# Patient Record
Sex: FEMALE | Race: WHITE | NOT HISPANIC OR LATINO | Employment: FULL TIME | URBAN - METROPOLITAN AREA
[De-identification: names, ages, dates, MRNs, and addresses within clinical notes are randomized per-mention and may not be internally consistent; named-entity substitution may affect disease eponyms.]

---

## 2023-04-19 PROBLEM — H69.91 DYSFUNCTION OF RIGHT EUSTACHIAN TUBE: Status: ACTIVE | Noted: 2023-04-19

## 2023-04-19 PROBLEM — M25.552 LEFT HIP PAIN: Status: ACTIVE | Noted: 2023-04-19

## 2023-04-19 PROBLEM — E66.3 OVERWEIGHT: Status: ACTIVE | Noted: 2023-04-19

## 2023-04-19 PROBLEM — E03.9 HYPOTHYROIDISM: Status: ACTIVE | Noted: 2023-04-19

## 2023-04-19 PROBLEM — Z97.3 WEARS GLASSES: Status: ACTIVE | Noted: 2023-04-19

## 2023-04-19 PROBLEM — M19.90 OSTEOARTHRITIS: Status: ACTIVE | Noted: 2023-04-19

## 2023-04-19 PROBLEM — I27.24 CTEPH (CHRONIC THROMBOEMBOLIC PULMONARY HYPERTENSION) (MULTI): Status: ACTIVE | Noted: 2023-04-19

## 2023-04-19 PROBLEM — M41.80 DEXTROSCOLIOSIS: Status: ACTIVE | Noted: 2023-04-19

## 2023-04-19 PROBLEM — M47.816 LUMBAR SPONDYLOSIS: Status: ACTIVE | Noted: 2023-04-19

## 2023-04-19 PROBLEM — J01.90 ACUTE SINUSITIS: Status: ACTIVE | Noted: 2023-04-19

## 2023-04-19 PROBLEM — R06.83 SNORING: Status: ACTIVE | Noted: 2023-04-19

## 2023-04-19 PROBLEM — M41.9 SCOLIOSIS: Status: ACTIVE | Noted: 2023-04-19

## 2023-04-19 PROBLEM — J82.83 EOSINOPHILIC ASTHMA (HHS-HCC): Status: ACTIVE | Noted: 2023-04-19

## 2023-04-19 PROBLEM — D89.89 ANTISYNTHETASE SYNDROME (MULTI): Status: ACTIVE | Noted: 2023-04-19

## 2023-04-19 PROBLEM — M06.9 RHEUMATOID ARTHRITIS (MULTI): Status: ACTIVE | Noted: 2023-04-19

## 2023-04-19 PROBLEM — J44.9 COPD (CHRONIC OBSTRUCTIVE PULMONARY DISEASE) (MULTI): Status: ACTIVE | Noted: 2023-04-19

## 2023-04-19 PROBLEM — E78.5 DYSLIPIDEMIA: Status: ACTIVE | Noted: 2023-04-19

## 2023-04-19 PROBLEM — J45.909 ASTHMATIC BRONCHITIS (HHS-HCC): Status: ACTIVE | Noted: 2023-04-19

## 2023-04-19 PROBLEM — L98.9 SKIN LESION: Status: ACTIVE | Noted: 2023-04-19

## 2023-04-19 PROBLEM — R06.2 WHEEZING ON AUSCULTATION: Status: ACTIVE | Noted: 2023-04-19

## 2023-04-19 PROBLEM — R60.9 EDEMA: Status: ACTIVE | Noted: 2023-04-19

## 2023-04-19 PROBLEM — G56.00 CARPAL TUNNEL SYNDROME: Status: ACTIVE | Noted: 2023-04-19

## 2023-04-19 PROBLEM — M85.80 OSTEOPENIA: Status: ACTIVE | Noted: 2023-04-19

## 2023-04-19 PROBLEM — K21.9 ACID REFLUX: Status: ACTIVE | Noted: 2023-04-19

## 2023-04-19 PROBLEM — L30.1 DYSHIDROTIC ECZEMA: Status: ACTIVE | Noted: 2023-04-19

## 2023-04-19 PROBLEM — M54.50 LOW BACK PAIN: Status: ACTIVE | Noted: 2023-04-19

## 2023-04-19 PROBLEM — E55.9 VITAMIN D DEFICIENCY: Status: ACTIVE | Noted: 2023-04-19

## 2023-04-19 RX ORDER — FLUTICASONE FUROATE, UMECLIDINIUM BROMIDE AND VILANTEROL TRIFENATATE 200; 62.5; 25 UG/1; UG/1; UG/1
POWDER RESPIRATORY (INHALATION)
COMMUNITY
Start: 2020-12-16

## 2023-04-19 RX ORDER — SULFASALAZINE 500 MG/1
TABLET, DELAYED RELEASE ORAL
COMMUNITY
Start: 2012-10-05 | End: 2023-08-03 | Stop reason: DRUGHIGH

## 2023-04-19 RX ORDER — CALCIUM CARBONATE/VITAMIN D3 600 MG-20
1 TABLET,CHEWABLE ORAL DAILY
COMMUNITY
Start: 2013-03-19

## 2023-04-19 RX ORDER — CHOLECALCIFEROL (VITAMIN D3) 125 MCG
CAPSULE ORAL
COMMUNITY
Start: 2021-10-13

## 2023-04-19 RX ORDER — PREDNISONE 5 MG/1
1 TABLET ORAL DAILY
COMMUNITY
Start: 2013-01-24 | End: 2023-08-03 | Stop reason: ALTCHOICE

## 2023-04-19 RX ORDER — IPRATROPIUM BROMIDE AND ALBUTEROL SULFATE 2.5; .5 MG/3ML; MG/3ML
SOLUTION RESPIRATORY (INHALATION) 4 TIMES DAILY
COMMUNITY
Start: 2017-03-08

## 2023-04-19 RX ORDER — LEVOTHYROXINE SODIUM 75 UG/1
TABLET ORAL
COMMUNITY
Start: 2016-03-10 | End: 2024-01-19 | Stop reason: SDUPTHER

## 2023-04-19 RX ORDER — ALENDRONATE SODIUM 70 MG/1
TABLET ORAL
COMMUNITY
Start: 2021-03-02 | End: 2023-08-03 | Stop reason: ALTCHOICE

## 2023-04-19 RX ORDER — ALBUTEROL SULFATE 90 UG/1
AEROSOL, METERED RESPIRATORY (INHALATION)
COMMUNITY
Start: 2013-03-19 | End: 2024-01-19 | Stop reason: SDUPTHER

## 2023-05-16 ENCOUNTER — PATIENT OUTREACH (OUTPATIENT)
Dept: CARE COORDINATION | Facility: CLINIC | Age: 58
End: 2023-05-16
Payer: COMMERCIAL

## 2023-05-16 RX ORDER — DOXYCYCLINE 100 MG/1
100 CAPSULE ORAL 2 TIMES DAILY
COMMUNITY
Start: 2023-05-14 | End: 2023-08-03 | Stop reason: ALTCHOICE

## 2023-05-16 RX ORDER — PREDNISONE 10 MG/1
TABLET ORAL
COMMUNITY
Start: 2023-05-14 | End: 2024-03-29 | Stop reason: ALTCHOICE

## 2023-05-16 RX ORDER — PANTOPRAZOLE SODIUM 40 MG/1
40 TABLET, DELAYED RELEASE ORAL DAILY
COMMUNITY
Start: 2023-05-14 | End: 2023-08-03 | Stop reason: WASHOUT

## 2023-05-16 NOTE — PROGRESS NOTES
Discharge Facility: [facility]  Discharge Diagnosis: COPD exacerbation  Admission Date: 5/11/23  Discharge Date: 5/14/23    PCP Appointment Date: TBD unable to reach patient  Specialist Appointment Date: TBD patient was told to fu with Dr Banegas  Hospital Encounter and Summary: Linked to encounter however more info available in [place] [facility]    Attempted to outreach patient twice for [facility] fu, left voicemail x2 with no return call as of this note.

## 2023-05-31 ENCOUNTER — DOCUMENTATION (OUTPATIENT)
Dept: PRIMARY CARE | Facility: CLINIC | Age: 58
End: 2023-05-31
Payer: COMMERCIAL

## 2023-06-01 ENCOUNTER — PATIENT OUTREACH (OUTPATIENT)
Dept: CARE COORDINATION | Facility: CLINIC | Age: 58
End: 2023-06-01
Payer: COMMERCIAL

## 2023-06-01 NOTE — PROGRESS NOTES
Discharge Facility: [facility]  Discharge Diagnosis: COPD exacerbation  Admission Date: 5/26/23  Discharge Date: 5/30/23    PCP Appointment Date: N/A unable to reach  Specialist Appointment Date: Dr Banegas unable to reach  Hospital Encounter and Summary: Available in [place] [facility]    Patient readmitted for COPD within 30 days, unable to reach at this time left VM x2. Has PCP appt in July. Following with pulmonology at [facility].

## 2023-06-15 ENCOUNTER — PATIENT OUTREACH (OUTPATIENT)
Dept: CARE COORDINATION | Facility: CLINIC | Age: 58
End: 2023-06-15
Payer: COMMERCIAL

## 2023-06-15 NOTE — PROGRESS NOTES
Unable to reach patient for call back after patient's hospitalization.  M with call back number for patient to call if needed   If no voicemail available call attempts x 2 were made to contact the patient to assist with any questions or concerns patient may have.

## 2023-08-03 ENCOUNTER — OFFICE VISIT (OUTPATIENT)
Dept: PRIMARY CARE | Facility: CLINIC | Age: 58
End: 2023-08-03
Payer: COMMERCIAL

## 2023-08-03 VITALS
SYSTOLIC BLOOD PRESSURE: 131 MMHG | TEMPERATURE: 98.7 F | DIASTOLIC BLOOD PRESSURE: 85 MMHG | OXYGEN SATURATION: 96 % | WEIGHT: 216 LBS | HEART RATE: 95 BPM | BODY MASS INDEX: 38.26 KG/M2 | RESPIRATION RATE: 16 BRPM

## 2023-08-03 DIAGNOSIS — Z12.31 ENCOUNTER FOR SCREENING MAMMOGRAM FOR BREAST CANCER: ICD-10-CM

## 2023-08-03 DIAGNOSIS — E03.9 ACQUIRED HYPOTHYROIDISM: Primary | ICD-10-CM

## 2023-08-03 DIAGNOSIS — J42 CHRONIC BRONCHITIS, UNSPECIFIED CHRONIC BRONCHITIS TYPE (MULTI): ICD-10-CM

## 2023-08-03 DIAGNOSIS — K21.00 GASTROESOPHAGEAL REFLUX DISEASE WITH ESOPHAGITIS WITHOUT HEMORRHAGE: ICD-10-CM

## 2023-08-03 DIAGNOSIS — M05.79 RHEUMATOID ARTHRITIS INVOLVING MULTIPLE SITES WITH POSITIVE RHEUMATOID FACTOR (MULTI): ICD-10-CM

## 2023-08-03 DIAGNOSIS — Z71.85 IMMUNIZATION COUNSELING: ICD-10-CM

## 2023-08-03 PROBLEM — F17.200 NICOTINE DEPENDENCE: Status: ACTIVE | Noted: 2023-08-03

## 2023-08-03 PROBLEM — J01.90 ACUTE SINUSITIS: Status: RESOLVED | Noted: 2023-04-19 | Resolved: 2023-08-03

## 2023-08-03 PROCEDURE — 90677 PCV20 VACCINE IM: CPT | Performed by: INTERNAL MEDICINE

## 2023-08-03 PROCEDURE — 90471 IMMUNIZATION ADMIN: CPT | Performed by: INTERNAL MEDICINE

## 2023-08-03 PROCEDURE — 99214 OFFICE O/P EST MOD 30 MIN: CPT | Performed by: INTERNAL MEDICINE

## 2023-08-03 RX ORDER — LEVOFLOXACIN 500 MG/1
TABLET, FILM COATED ORAL
COMMUNITY
Start: 2023-04-25 | End: 2023-08-03 | Stop reason: ALTCHOICE

## 2023-08-03 RX ORDER — MONTELUKAST SODIUM 10 MG/1
10 TABLET ORAL DAILY
COMMUNITY
Start: 2023-04-25 | End: 2023-08-03 | Stop reason: ALTCHOICE

## 2023-08-03 RX ORDER — SULFASALAZINE 500 MG/1
1500 TABLET, DELAYED RELEASE ORAL 2 TIMES DAILY
Status: SHIPPED | COMMUNITY
Start: 2023-08-03 | End: 2023-11-13

## 2023-08-03 RX ORDER — PANTOPRAZOLE SODIUM 40 MG/1
40 TABLET, DELAYED RELEASE ORAL DAILY
Qty: 90 TABLET | Refills: 3 | Status: SHIPPED | OUTPATIENT
Start: 2023-08-03 | End: 2024-03-29 | Stop reason: ALTCHOICE

## 2023-08-03 ASSESSMENT — ENCOUNTER SYMPTOMS
LOSS OF SENSATION IN FEET: 0
DEPRESSION: 0
OCCASIONAL FEELINGS OF UNSTEADINESS: 0

## 2023-08-03 ASSESSMENT — PATIENT HEALTH QUESTIONNAIRE - PHQ9
1. LITTLE INTEREST OR PLEASURE IN DOING THINGS: NOT AT ALL
2. FEELING DOWN, DEPRESSED OR HOPELESS: NOT AT ALL
SUM OF ALL RESPONSES TO PHQ9 QUESTIONS 1 AND 2: 0

## 2023-08-03 NOTE — PROGRESS NOTES
Subjective   Patient ID: Brie Pete is a 57 y.o. female who presents for Follow-up.    Here for follow-up.  Doing better.  From a respiratory standpoint.    However her back has worsened and she is considering back surgery-she will be seeing Dr. Portillo         Review of Systems    Objective   /85 (BP Location: Left arm, Patient Position: Sitting, BP Cuff Size: Adult)   Pulse 95   Temp 37.1 °C (98.7 °F)   Resp 16   Wt 98 kg (216 lb)   SpO2 96%   BMI 38.26 kg/m²     Physical Exam  Vitals reviewed.   Constitutional:       Appearance: Normal appearance.   HENT:      Head: Normocephalic and atraumatic.   Eyes:      General: No scleral icterus.        Right eye: No discharge.         Left eye: No discharge.      Extraocular Movements: Extraocular movements intact.      Conjunctiva/sclera: Conjunctivae normal.      Pupils: Pupils are equal, round, and reactive to light.   Cardiovascular:      Rate and Rhythm: Normal rate and regular rhythm.      Pulses: Normal pulses.      Heart sounds: Normal heart sounds. No murmur heard.  Pulmonary:      Effort: Pulmonary effort is normal.      Breath sounds: Normal breath sounds. No wheezing or rhonchi.   Musculoskeletal:         General: No deformity or signs of injury. Normal range of motion.      Cervical back: Normal range of motion and neck supple. No rigidity or tenderness.   Lymphadenopathy:      Cervical: No cervical adenopathy.   Skin:     General: Skin is warm and dry.      Findings: No rash.   Neurological:      General: No focal deficit present.      Mental Status: She is alert and oriented to person, place, and time. Mental status is at baseline.      Cranial Nerves: No cranial nerve deficit.      Sensory: No sensory deficit.      Gait: Gait normal.   Psychiatric:         Mood and Affect: Mood normal.         Behavior: Behavior normal.         Thought Content: Thought content normal.         Judgment: Judgment normal.         Assessment/Plan   Problem  List Items Addressed This Visit       Acid reflux    Relevant Medications    pantoprazole (ProtoNix) 40 mg EC tablet    COPD (chronic obstructive pulmonary disease) (CMS/HCC)    Hypothyroidism - Primary    Relevant Orders    TSH with reflex to Free T4 if abnormal    Rheumatoid arthritis (CMS/HCC)     Other Visit Diagnoses       Immunization counseling        Relevant Orders    Pneumococcal conjugate vaccine, 20-valent, adult (PREVNAR 20)    Encounter for screening mammogram for breast cancer        Relevant Orders    BI mammo bilateral screening tomosynthesis             Early COPD/recurrent asthma symptoms with recent admission-. She has established with Dr. Banegas, a pulmonologist at [facility]. She will see him soon- Oct. PFTs suggest eosinophilic asthma- Pneumovax updated    She was admitted New Year's Clementine2020, and then again in 2021 with wheezing exacerbations. Presently much improved using Trelegy MDI.  She had an asthma flare in 2023.          She will follow up with Dr. Nicole/Mary regularly     Seasonal allergies-she's not on the fexofenadine, nor did she set up an appt w/ Dr. Anderson as suggested. no fall pollen issues of late. Doing well despite ragweed.     Tobacco use- Encourage long-term cessation.           At this point she is still smoking     Regarding history of palpitations/chest pain syndrome winter 2017- status post evaluation prior Dr. Mullen in cardiology who was involved with her recent care at [facility]. No recent cardiac symptoms except for rare brief palpitations. Obviously tobacco cessation remains a priority   Occasional chest discomfort inferior to the right clavicle area. No breast pain.. CXR ordered.      Family history of CVA- her mother had her first stroke at 57 and  at 59. Encouraged ongoing healthy lifestyle measures including tobacco cessation fitness and weight loss accordingly     Exercise routine - at gym 2-3 days /wk and walks up to 7,000  steps daily. Encouraged ongoing efforts; present plan continues     BMI over 30-weight loss efforts-she has lost 54 pounds, and her BMI get down to 30. Unfortunately with the pandemic it is back up to 35. She will continue long-term weight loss efforts           August 2023-BMI presently at 38.  She will continue efforts      Rheumatoid arthritis/vitamin D deficiency/osteopenia- she will continue rheumatology management and medication accordingly with Dr. Jung. DEXA needs updated she states.    Next appointment with Dr. Jung in Oct.      Recurrent difficult back pain/scoliosis-she has been working with pain clinic unfortunately without any improvement following injections.  She will review her options with Dr. Portillo and spinal surgery.  Encouraged her also to have the left knee evaluated with the apparent leg length discrepancy issue    Left knee-she has developed a valgus deformity over the last year.  Encouraged orthopedic evaluation     Gastritis/acid reflux-she was on PPI in the past. Some recent gastritis symptoms. Encouraged limiting coffee and orange juice and using Pepcid AC and follow-up with concerns     Hypothyroidism-we'll manage Synthroid based on labs-semiannual TSH ordered for fall          History of carpal tunnel syndrome-caution with overuse actually complains. Not problematic history.      Gynecologic care-she we'll continue to see Dr. Kalyn Martin at [facility] annually. Updated      Annual mammogram - updated 06/22.              Mammogram ordered     History of gallstones-she had an ERCP and then a cholecystectomy 2016. Asymptomatic presently     Colon cancer screening- hyperplastic polyps -last colonoscopy. with Dr. Adair June 2017-pathology reviewed- hyperplastic colon polyps- who recommends her next colonoscopy in 5 years-June 2022. Ordered   colonoscopy overdue, ordered previously. She will get this set up        Glasses/vision care- patient will continue routine vision exams and visits  at least biannually.    Due this yr. Plans to go this fall - [employer]     Forehead papule/ left third MCP dorsal cyst/family history of melanoma-have encouraged dermatology follow-up in the past. Dermatology consultation ordered last time, but she's cancelled at [facility]/ work issues. She plans to set up an appointment     Dental care-encouraged semiannual dental visits. Just saw Dr. Engle. cleaning & extractions all up to date        Caregiver concerns-support provided regarding her daughter-Violet critical illnesses- Antisynthetase Syndrome. She was admitted to the hospital earlier this year on the ventilator.          Presently doing better on BiPAP nightly.  She is looking forward to getting her 's license soon.        Work wellness paperwork-she will do soon     Flu shot each fall     Covid series completed. Two booster shots completed. 3rd shot schedule 10/15/22. Advised her to postpone in light of present illness.      Pneumovax booster 4/19    Prevnar 20-after reviewing on the pneumonia vaccination [place]-is a smoker she qualifies for the Prevnar 20 update-provided 8/3/2023-today     Follow-up 6 months wellness visit, sooner as needed     Charting was completed using voice recognition technology and may include unintended errors.

## 2023-08-08 ENCOUNTER — TELEPHONE (OUTPATIENT)
Dept: PRIMARY CARE | Facility: CLINIC | Age: 58
End: 2023-08-08
Payer: COMMERCIAL

## 2023-08-08 NOTE — TELEPHONE ENCOUNTER
Pt was in last week with Dr. Mitchell and she wanted to know if she can get an order for xray on her right foot so she can get it done at [facility] where she works. If calling before 4 call [phone]

## 2023-08-09 DIAGNOSIS — M79.671 RIGHT FOOT PAIN: Primary | ICD-10-CM

## 2023-08-10 NOTE — TELEPHONE ENCOUNTER
Spoke with patient was at urgent care yesterday, had a scan for DVT (in chart)   Foot is not fractured but a bakers cyst was found   Needs order for A1c and cholesterol labs for physical form as she has not had these done

## 2023-08-11 DIAGNOSIS — E78.5 DYSLIPIDEMIA: Primary | ICD-10-CM

## 2023-08-11 DIAGNOSIS — R73.09 ELEVATED GLUCOSE: ICD-10-CM

## 2023-10-26 ENCOUNTER — TELEPHONE (OUTPATIENT)
Dept: PRIMARY CARE | Facility: CLINIC | Age: 58
End: 2023-10-26
Payer: COMMERCIAL

## 2023-10-26 NOTE — TELEPHONE ENCOUNTER
Patient asking for a call back regarding Prednisone asking for decrease in dose she was prescribed 10 mg in March but has since had back surgery and would like to decrease dose if possible.    [phone]

## 2023-11-08 DIAGNOSIS — M06.9 RHEUMATOID ARTHRITIS, INVOLVING UNSPECIFIED SITE, UNSPECIFIED WHETHER RHEUMATOID FACTOR PRESENT (MULTI): Primary | ICD-10-CM

## 2023-11-08 RX ORDER — PREDNISONE 5 MG/1
TABLET ORAL
Qty: 45 TABLET | Refills: 2 | Status: SHIPPED | OUTPATIENT
Start: 2023-11-08 | End: 2024-02-14 | Stop reason: SDUPTHER

## 2023-11-13 DIAGNOSIS — M06.9 RHEUMATOID ARTHRITIS, INVOLVING UNSPECIFIED SITE, UNSPECIFIED WHETHER RHEUMATOID FACTOR PRESENT (MULTI): Primary | ICD-10-CM

## 2023-11-13 RX ORDER — SULFASALAZINE 500 MG/1
TABLET, DELAYED RELEASE ORAL
Qty: 540 TABLET | Refills: 1 | Status: SHIPPED | OUTPATIENT
Start: 2023-11-13

## 2023-11-30 LAB
NON-UH HIE A/G RATIO: 1.2
NON-UH HIE ALB: 3.5 G/DL (ref 3.4–5)
NON-UH HIE ALK PHOS: 84 UNIT/L (ref 45–117)
NON-UH HIE BILIRUBIN, TOTAL: 0.3 MG/DL (ref 0.3–1.2)
NON-UH HIE BUN/CREAT RATIO: 25
NON-UH HIE BUN: 15 MG/DL (ref 9–23)
NON-UH HIE C-REACTIVE PROTEIN, QUANTITATIVE: 0.6 MG/DL (ref 0–0.9)
NON-UH HIE CALCIUM: 9.3 MG/DL (ref 8.7–10.4)
NON-UH HIE CALCULATED LDL CHOLESTEROL: 121 MG/DL (ref 60–130)
NON-UH HIE CALCULATED OSMOLALITY: 281 MOSM/KG (ref 275–295)
NON-UH HIE CHLORIDE: 107 MMOL/L (ref 98–107)
NON-UH HIE CHOLESTEROL: 207 MG/DL (ref 100–200)
NON-UH HIE CO2, VENOUS: 29 MMOL/L (ref 20–31)
NON-UH HIE CREATININE: 0.6 MG/DL (ref 0.5–0.8)
NON-UH HIE GFR AA: >60
NON-UH HIE GLOBULIN: 2.9 G/DL
NON-UH HIE GLOMERULAR FILTRATION RATE: >60 ML/MIN/1.73M?
NON-UH HIE GLUCOSE: 80 MG/DL (ref 74–106)
NON-UH HIE GLUCOSE: 80 MG/DL (ref 74–106)
NON-UH HIE GOT: 11 UNIT/L (ref 15–37)
NON-UH HIE GPT: 12 UNIT/L (ref 10–49)
NON-UH HIE HDL CHOLESTEROL: 71 MG/DL (ref 40–60)
NON-UH HIE K: 4.2 MMOL/L (ref 3.5–5.1)
NON-UH HIE NA: 141 MMOL/L (ref 135–145)
NON-UH HIE TOTAL CHOL/HDL CHOL RATIO: 2.9
NON-UH HIE TOTAL PROTEIN: 6.4 G/DL (ref 5.7–8.2)
NON-UH HIE TRIGLYCERIDES: 76 MG/DL (ref 30–150)
NON-UH HIE TSH: 1.03 UIU/ML (ref 0.55–4.78)

## 2023-12-01 NOTE — RESULT ENCOUNTER NOTE
Malia    Thanks for doing the labs.  I am glad that the glucose is normal without signs of diabetes.  Kidney and liver labs look fine as do the electrolytes.  The LDL/bad cholesterol is a bit over goal-at 121.  Ideally this should be under 100.  Finally the thyroid lab looks fine.    Wishing you and Violet the very best in the new year.    Sincerely,  Genaro Mitchell MD

## 2024-01-02 ENCOUNTER — APPOINTMENT (OUTPATIENT)
Dept: RHEUMATOLOGY | Facility: CLINIC | Age: 59
End: 2024-01-02
Payer: COMMERCIAL

## 2024-01-19 DIAGNOSIS — J42 CHRONIC BRONCHITIS, UNSPECIFIED CHRONIC BRONCHITIS TYPE (MULTI): ICD-10-CM

## 2024-01-19 DIAGNOSIS — E03.9 ACQUIRED HYPOTHYROIDISM: Primary | ICD-10-CM

## 2024-01-19 RX ORDER — ALBUTEROL SULFATE 90 UG/1
2 AEROSOL, METERED RESPIRATORY (INHALATION) EVERY 6 HOURS PRN
Qty: 18 G | Refills: 11 | Status: SHIPPED | OUTPATIENT
Start: 2024-01-19

## 2024-01-19 RX ORDER — LEVOTHYROXINE SODIUM 75 UG/1
75 TABLET ORAL
Qty: 90 TABLET | Refills: 1 | Status: SHIPPED | OUTPATIENT
Start: 2024-01-19

## 2024-02-07 ENCOUNTER — APPOINTMENT (OUTPATIENT)
Dept: PRIMARY CARE | Facility: CLINIC | Age: 59
End: 2024-02-07
Payer: COMMERCIAL

## 2024-02-14 DIAGNOSIS — M06.9 RHEUMATOID ARTHRITIS, INVOLVING UNSPECIFIED SITE, UNSPECIFIED WHETHER RHEUMATOID FACTOR PRESENT (MULTI): ICD-10-CM

## 2024-02-14 RX ORDER — PREDNISONE 5 MG/1
TABLET ORAL
Qty: 90 TABLET | Refills: 1 | Status: SHIPPED | OUTPATIENT
Start: 2024-02-14

## 2024-02-22 LAB
NON-UH HIE A/G RATIO: 1
NON-UH HIE ALB: 3.3 G/DL (ref 3.4–5)
NON-UH HIE ALK PHOS: 102 UNIT/L (ref 45–117)
NON-UH HIE BASO COUNT: 0.15 X1000 (ref 0–0.2)
NON-UH HIE BASOS %: 1.9 %
NON-UH HIE BILIRUBIN, TOTAL: 0.3 MG/DL (ref 0.3–1.2)
NON-UH HIE BUN/CREAT RATIO: 18.3
NON-UH HIE BUN: 11 MG/DL (ref 9–23)
NON-UH HIE C-REACTIVE PROTEIN, QUANTITATIVE: 0.6 MG/DL (ref 0–0.9)
NON-UH HIE CALCIUM: 9.4 MG/DL (ref 8.7–10.4)
NON-UH HIE CALCULATED OSMOLALITY: 279 MOSM/KG (ref 275–295)
NON-UH HIE CHLORIDE: 106 MMOL/L (ref 98–107)
NON-UH HIE CO2, VENOUS: 31 MMOL/L (ref 20–31)
NON-UH HIE CREATININE: 0.6 MG/DL (ref 0.5–0.8)
NON-UH HIE DIFF?: NO
NON-UH HIE EOS COUNT: 0.24 X1000 (ref 0–0.5)
NON-UH HIE EOSIN %: 3 %
NON-UH HIE GFR AA: >60
NON-UH HIE GLOBULIN: 3.2 G/DL
NON-UH HIE GLOMERULAR FILTRATION RATE: >60 ML/MIN/1.73M?
NON-UH HIE GLUCOSE: 59 MG/DL (ref 74–106)
NON-UH HIE GOT: 11 UNIT/L (ref 15–37)
NON-UH HIE GPT: 8 UNIT/L (ref 10–49)
NON-UH HIE HCT: 38.6 % (ref 36–46)
NON-UH HIE HGB: 13.1 G/DL (ref 12–16)
NON-UH HIE INSTR WBC: 7.9
NON-UH HIE K: 4.5 MMOL/L (ref 3.5–5.1)
NON-UH HIE LYMPH %: 32.2 %
NON-UH HIE LYMPH COUNT: 2.53 X1000 (ref 1.2–4.8)
NON-UH HIE MCH: 31.4 PG (ref 27–34)
NON-UH HIE MCHC: 33.8 G/DL (ref 32–37)
NON-UH HIE MCV: 92.8 FL (ref 80–100)
NON-UH HIE MONO %: 8.1 %
NON-UH HIE MONO COUNT: 0.63 X1000 (ref 0.1–1)
NON-UH HIE MPV: 7.8 FL (ref 7.4–10.4)
NON-UH HIE NA: 141 MMOL/L (ref 135–145)
NON-UH HIE NEUTROPHIL %: 54.8 %
NON-UH HIE NEUTROPHIL COUNT (ANC): 4.3 X1000 (ref 1.4–8.8)
NON-UH HIE NUCLEATED RBC: 0 /100WBC
NON-UH HIE PLATELET: 304 X10 (ref 150–450)
NON-UH HIE RBC: 4.16 X10 (ref 4.2–5.4)
NON-UH HIE RDW: 14.6 % (ref 11.5–14.5)
NON-UH HIE TOTAL PROTEIN: 6.5 G/DL (ref 5.7–8.2)
NON-UH HIE WBC: 7.9 X10 (ref 4.5–11)

## 2024-02-26 ENCOUNTER — APPOINTMENT (OUTPATIENT)
Dept: RHEUMATOLOGY | Facility: CLINIC | Age: 59
End: 2024-02-26
Payer: COMMERCIAL

## 2024-03-18 ENCOUNTER — APPOINTMENT (OUTPATIENT)
Dept: RHEUMATOLOGY | Facility: CLINIC | Age: 59
End: 2024-03-18
Payer: COMMERCIAL

## 2024-03-21 ENCOUNTER — APPOINTMENT (OUTPATIENT)
Dept: PRIMARY CARE | Facility: CLINIC | Age: 59
End: 2024-03-21
Payer: COMMERCIAL

## 2024-03-29 ENCOUNTER — OFFICE VISIT (OUTPATIENT)
Dept: RHEUMATOLOGY | Facility: CLINIC | Age: 59
End: 2024-03-29
Payer: COMMERCIAL

## 2024-03-29 VITALS
SYSTOLIC BLOOD PRESSURE: 144 MMHG | TEMPERATURE: 97.9 F | HEIGHT: 63 IN | OXYGEN SATURATION: 97 % | WEIGHT: 215.8 LBS | BODY MASS INDEX: 38.24 KG/M2 | DIASTOLIC BLOOD PRESSURE: 88 MMHG | HEART RATE: 84 BPM

## 2024-03-29 DIAGNOSIS — Z79.52 CURRENT CHRONIC USE OF SYSTEMIC STEROIDS: Primary | ICD-10-CM

## 2024-03-29 DIAGNOSIS — M06.9 RHEUMATOID ARTHRITIS INVOLVING MULTIPLE SITES, UNSPECIFIED WHETHER RHEUMATOID FACTOR PRESENT (MULTI): ICD-10-CM

## 2024-03-29 PROCEDURE — 99214 OFFICE O/P EST MOD 30 MIN: CPT | Performed by: INTERNAL MEDICINE

## 2024-03-29 PROCEDURE — 3008F BODY MASS INDEX DOCD: CPT | Performed by: INTERNAL MEDICINE

## 2024-03-29 RX ORDER — PREGABALIN 150 MG/1
150 CAPSULE ORAL 2 TIMES DAILY
COMMUNITY

## 2024-03-29 ASSESSMENT — ENCOUNTER SYMPTOMS
LOSS OF SENSATION IN FEET: 0
DEPRESSION: 0
OCCASIONAL FEELINGS OF UNSTEADINESS: 0

## 2024-03-29 ASSESSMENT — PATIENT HEALTH QUESTIONNAIRE - PHQ9
SUM OF ALL RESPONSES TO PHQ9 QUESTIONS 1 AND 2: 0
1. LITTLE INTEREST OR PLEASURE IN DOING THINGS: NOT AT ALL
2. FEELING DOWN, DEPRESSED OR HOPELESS: NOT AT ALL

## 2024-03-29 NOTE — PATIENT INSTRUCTIONS
Check blood pressure at home on home cuff.  Check labs 5/24: CBC with diff, CMP, CRP.  Bone density ordered.  Follow-up in 3 months.

## 2024-03-29 NOTE — PROGRESS NOTES
Subjective   Patient ID: Brie Pete is a 58 y.o. female who presents for Follow-up regarding rheumatoid arthritis.    HPI 58 yo F here for f/u re: RA.   She was last seen by me January 2023.     She remained on sulfasalazine 1500 mg twice daily and prednisone 5 mg daily.  Whenever we previously tried to taper the prednisone, she experienced a flare of her arthritis.     LS-spine x-ray done September 2022 shows S shaped scoliosis, with multilevel degenerative disc disease.  There is mild retrolisthesis of L2 and L3 and L4 on L5.  Moderate degenerative changes are noted in the left hip.     She completed 6 weeks of physical therapy in 2022 for low back pain .  MRI of LS spine done February 2023 showed left parasagittal disc herniation at L2, with multilevel degenerative disc disease.  There are varying degrees of neuroforaminal narrowing on the left, especially at L2-3, L3-4, and L4-5.  She has moderately severe spinal stenosis at L4-5.    He has had 3 epidural injections, last being March 25, 2024.   These have helped.    She had her left knee replaced December 7, 2023 with good clinical outcome.  She returned to work January 25, 2024.  She is still ambulating with a cane.    xray of her left hip 5/20 showed mild OA.     She has been on chronic daily prednisone since prior to 2005 (dose was initially 10 mg every other day alternating with 5 mg every other day)-he has now been weaned to 5 mg daily.   She started alendronate 2/21, but she now admits that she has not taken alendronate for the last year.    She did have an ER visit March 11, 2024 with chills and cough.  Testing was negative for COVID and influenza.  She was treated with 7 days of Augmentin and she is now feeling better.  She also received prednisone 20 mg twice daily for 5 days.     Her daughter is still struggling with antisynthetase syndrome.     DEXA 2/16 shows T score -1.1 FN.  DEXA 10/21: T score -2.1 left femoral neck, T score -1.5 left total  "hip, normal lumbar spine T score     Labs October 2022: CMP normal, CBC normal, CRP less than 0.4 (0- 0.9), 25-hydroxy vitamin D 47, TSH 2.2, hemoglobin A1c 4.9  Labs February 2024: CMP normal except albumin 3.3, CBC normal, CRP 0.6 (normal 0-0.9)       Review of Systems  General: Denies fevers or chills.  CV: Denies chest pain or palpitations.  Denies leg edema.  Lungs: Denies coughing or shortness of breath.  Skin: Denies rashes or nodules.  MS: Denies joint pain or joint swelling.     Objective   /88 (BP Location: Left arm, Patient Position: Sitting, BP Cuff Size: Small adult)   Pulse 84   Temp 36.6 °C (97.9 °F)   Ht 1.6 m (5' 3\")   Wt 97.9 kg (215 lb 12.8 oz)   SpO2 97%   BMI 38.23 kg/m²     Physical Exam  HEENT: PERRL, EOMI  Neck: Supple, no nodes.  CV: RRR, no MGR.  Lungs: Clear, no rales or wheezes.  Abdomen: Soft, nontender. No hepatosplenomegaly.  Extremities:  No cyanosis, clubbing, or edema.  MS: Swollen: 0         Tender: 0         Patient global: 3         Evaluator global: 3          CDAI: 6 (low disease activity)          Ambulating with cane.           Status post left knee replacement.            Skin: No nodules or vasculitic lesions.    .  Assessment/Plan   Problem List Items Addressed This Visit             ICD-10-CM    Rheumatoid arthritis (CMS/[facility]) M06.9    BMI 38.0-38.9,adult Z68.38     Other Visit Diagnoses         Codes    Current chronic use of systemic steroids    -  Primary Z79.52    Relevant Orders    XR DEXA bone density    CBC and Auto Differential    Comprehensive Metabolic Panel    C-Reactive Protein            1. RA- low disease activity by CDAI.      2. Osteopenia- DEXA 10/21 shows T score -2.1 left femoral neck.   Alendronate added 2/21- however, she stopped about 3/23.  She is still taking Citracal 500 mg daily and vitamin D 5000 international units daily.  DEXA ordered today.    3. Lumbar spondylosis-she has had 3 epidural injections, the most recent being March 25, " 2024 . these have helped .    4. BMI 38- worse, will continue to work on weight reduction.    5.  Elevated blood pressure without diagnosis of hypertension-I asked her to start monitoring her blood pressure at home.     Plan:  Check blood pressure at home on home cuff.  Check labs 5/24: CBC with diff, CMP, CRP.  Bone density ordered.  Follow-up in 3 months.

## 2024-04-01 ENCOUNTER — APPOINTMENT (OUTPATIENT)
Dept: PRIMARY CARE | Facility: CLINIC | Age: 59
End: 2024-04-01
Payer: COMMERCIAL

## 2024-04-11 ENCOUNTER — APPOINTMENT (OUTPATIENT)
Dept: PRIMARY CARE | Facility: CLINIC | Age: 59
End: 2024-04-11
Payer: COMMERCIAL

## 2024-05-29 ENCOUNTER — APPOINTMENT (OUTPATIENT)
Dept: PRIMARY CARE | Facility: CLINIC | Age: 59
End: 2024-05-29
Payer: COMMERCIAL

## 2024-07-02 ENCOUNTER — APPOINTMENT (OUTPATIENT)
Dept: RHEUMATOLOGY | Facility: CLINIC | Age: 59
End: 2024-07-02
Payer: COMMERCIAL

## 2024-07-02 VITALS
WEIGHT: 218.6 LBS | HEART RATE: 85 BPM | DIASTOLIC BLOOD PRESSURE: 72 MMHG | TEMPERATURE: 97.9 F | HEIGHT: 63 IN | BODY MASS INDEX: 38.73 KG/M2 | OXYGEN SATURATION: 98 % | SYSTOLIC BLOOD PRESSURE: 120 MMHG

## 2024-07-02 DIAGNOSIS — M06.9 RHEUMATOID ARTHRITIS, INVOLVING UNSPECIFIED SITE, UNSPECIFIED WHETHER RHEUMATOID FACTOR PRESENT (MULTI): ICD-10-CM

## 2024-07-02 PROBLEM — E66.01 SEVERE OBESITY (MULTI): Status: ACTIVE | Noted: 2024-07-02

## 2024-07-02 PROBLEM — I99.8 VASCULAR INSUFFICIENCY: Status: ACTIVE | Noted: 2024-07-02

## 2024-07-02 PROBLEM — J98.8 VIRAL RESPIRATORY ILLNESS: Status: ACTIVE | Noted: 2024-03-09

## 2024-07-02 PROBLEM — J82.89 PULMONARY EOSINOPHILIA (MULTI): Status: ACTIVE | Noted: 2024-07-02

## 2024-07-02 PROBLEM — J31.0 RHINITIS: Status: ACTIVE | Noted: 2024-07-02

## 2024-07-02 PROBLEM — I26.99 PULMONARY HYPERTENSION DUE TO THROMBOEMBOLISM (MULTI): Status: ACTIVE | Noted: 2024-07-02

## 2024-07-02 PROBLEM — I27.29 PULMONARY HYPERTENSION DUE TO THROMBOEMBOLISM (MULTI): Status: ACTIVE | Noted: 2024-07-02

## 2024-07-02 PROBLEM — B97.89 VIRAL RESPIRATORY ILLNESS: Status: ACTIVE | Noted: 2024-03-09

## 2024-07-02 PROCEDURE — 99214 OFFICE O/P EST MOD 30 MIN: CPT | Performed by: INTERNAL MEDICINE

## 2024-07-02 PROCEDURE — 3008F BODY MASS INDEX DOCD: CPT | Performed by: INTERNAL MEDICINE

## 2024-07-02 RX ORDER — SULFASALAZINE 500 MG/1
TABLET, DELAYED RELEASE ORAL
Qty: 540 TABLET | Refills: 1 | Status: SHIPPED | OUTPATIENT
Start: 2024-07-02

## 2024-07-02 RX ORDER — PREDNISONE 5 MG/1
TABLET ORAL
Qty: 90 TABLET | Refills: 1 | Status: SHIPPED | OUTPATIENT
Start: 2024-07-02

## 2024-07-02 ASSESSMENT — ENCOUNTER SYMPTOMS
LOSS OF SENSATION IN FEET: 0
OCCASIONAL FEELINGS OF UNSTEADINESS: 1
DEPRESSION: 0

## 2024-07-02 NOTE — PROGRESS NOTES
Subjective   Patient ID: Brie Pete is a 58 y.o. female who presents for  follow up regarding rheumatoid arthritis.    HPI 58 yo F here for f/u re: RA.   She was last seen by me 3/29.     She remained on sulfasalazine 1500 mg twice daily and prednisone 5 mg daily.  Whenever we previously tried to taper the prednisone, she experienced a flare of her arthritis.    She had her left knee replaced 12/23.  The knee is doing well, but she c/o left hip pain. Pain is in left buttock and groin. She is going to follow up with Dr. Abdi to consider left hip replacement.  She is still walking with a cane.    She did not feel well on Friday and was wheezing. Rapid COVID test was negative. She was treated with an antibiotic and is feeling better.     LS-spine x-ray done September 2022 shows S shaped scoliosis, with multilevel degenerative disc disease.  There is mild retrolisthesis of L2 and L3 and L4 on L5.  Moderate degenerative changes are noted in the left hip.     She completed 6 weeks of physical therapy in 2022 for low back pain .  MRI of LS spine done February 2023 showed left parasagittal disc herniation at L2, with multilevel degenerative disc disease.  There are varying degrees of neuroforaminal narrowing on the left, especially at L2-3, L3-4, and L4-5.  She has moderately severe spinal stenosis at L4-5.    He has had 3 epidural injections, last being March 25, 2024.   These have helped.    xray of her left hip 5/20 showed mild OA.     She has been on chronic daily prednisone since prior to 2005 (dose was initially 10 mg every other day alternating with 5 mg every other day)-he has now been weaned to 5 mg daily.   She started alendronate 2/21, but she now admits that she has not taken alendronate for the last year.    Her daughter is still struggling with antisynthetase syndrome.     DEXA 2/16 shows T score -1.1 FN.  DEXA 10/21: T score -2.1 left femoral neck, T score -1.5 left total hip, normal lumbar spine T  "score     Labs October 2022: CMP normal, CBC normal, CRP less than 0.4 (0- 0.9), 25-hydroxy vitamin D 47, TSH 2.2, hemoglobin A1c 4.9  Labs February 2024: CMP normal except albumin 3.3, CBC normal, CRP 0.6 (normal 0-0.9)       Review of Systems  General: Denies fevers or chills.  CV: Denies chest pain or palpitations.  Denies leg edema.  Lungs: Denies coughing or shortness of breath.  Skin: Denies rashes or nodules.  MS: Denies joint pain or joint swelling, except left hip pain.    Objective   /72 (BP Location: Left arm, Patient Position: Sitting, BP Cuff Size: Large adult)   Pulse 85   Temp 36.6 °C (97.9 °F)   Ht 1.6 m (5' 3\")   Wt 99.2 kg (218 lb 9.6 oz)   SpO2 98%   BMI 38.72 kg/m²     Physical Exam  HEENT: PERRL, EOMI  Neck: Supple, no nodes.  CV: RRR, no MGR.  Lungs: Clear, no rales or wheezes.  Abdomen: Soft, nontender. No hepatosplenomegaly.  Extremities:  No cyanosis, clubbing, or edema.  MS: Swollen: 0         Tender: 0         Patient global: 1         Evaluator global: 2          CDAI: 3 (low disease activity)          Ambulating with cane.           Status post left knee replacement. Has limited internal rotation of left hip.            Skin: No nodules or vasculitic lesions.    .  Assessment/Plan   Problem List Items Addressed This Visit             ICD-10-CM    Rheumatoid arthritis (Multi) M06.9       1. RA- low disease activity by CDAI. Considering left hip replacement.     2. Osteopenia- DEXA 10/21 shows T score -2.1 left femoral neck.   Alendronate added 2/21- however, she stopped about 3/23.  She is still taking Citracal 500 mg daily and vitamin D 5000 international units daily.  DEXA ordered again today. She will likely be advised to stat alendronate again.    3. Lumbar spondylosis-she has had 3 epidural injections, the most recent being March 25, 2024 . these have helped .    4. BMI 38- worse, will continue to work on weight reduction.    Plan:  Check CBC with diff, CMP, CRP.  DEXA " ordered.  Follow-up in 3 months.

## 2024-07-03 PROBLEM — I27.29 PULMONARY HYPERTENSION DUE TO THROMBOEMBOLISM (MULTI): Status: RESOLVED | Noted: 2024-07-02 | Resolved: 2024-07-03

## 2024-07-03 PROBLEM — I26.99 PULMONARY HYPERTENSION DUE TO THROMBOEMBOLISM (MULTI): Status: RESOLVED | Noted: 2024-07-02 | Resolved: 2024-07-03

## 2024-07-03 PROBLEM — J44.9 COPD (CHRONIC OBSTRUCTIVE PULMONARY DISEASE) (MULTI): Status: RESOLVED | Noted: 2023-04-19 | Resolved: 2024-07-03

## 2024-07-08 ENCOUNTER — HOSPITAL ENCOUNTER (OUTPATIENT)
Dept: RADIOLOGY | Facility: CLINIC | Age: 59
Discharge: HOME | End: 2024-07-08
Payer: COMMERCIAL

## 2024-07-08 ENCOUNTER — LAB (OUTPATIENT)
Dept: LAB | Facility: LAB | Age: 59
End: 2024-07-08
Payer: COMMERCIAL

## 2024-07-08 ENCOUNTER — APPOINTMENT (OUTPATIENT)
Dept: PRIMARY CARE | Facility: CLINIC | Age: 59
End: 2024-07-08
Payer: COMMERCIAL

## 2024-07-08 VITALS
DIASTOLIC BLOOD PRESSURE: 81 MMHG | TEMPERATURE: 97.9 F | WEIGHT: 217 LBS | OXYGEN SATURATION: 96 % | SYSTOLIC BLOOD PRESSURE: 125 MMHG | BODY MASS INDEX: 38.45 KG/M2 | HEART RATE: 80 BPM | HEIGHT: 63 IN

## 2024-07-08 DIAGNOSIS — E55.9 VITAMIN D DEFICIENCY: Chronic | ICD-10-CM

## 2024-07-08 DIAGNOSIS — Z00.00 ANNUAL PHYSICAL EXAM: Primary | Chronic | ICD-10-CM

## 2024-07-08 DIAGNOSIS — M06.9 RHEUMATOID ARTHRITIS, INVOLVING UNSPECIFIED SITE, UNSPECIFIED WHETHER RHEUMATOID FACTOR PRESENT (MULTI): ICD-10-CM

## 2024-07-08 DIAGNOSIS — E78.5 DYSLIPIDEMIA: Chronic | ICD-10-CM

## 2024-07-08 DIAGNOSIS — Z23 IMMUNIZATION DUE: Chronic | ICD-10-CM

## 2024-07-08 DIAGNOSIS — Z96.652 STATUS POST LEFT KNEE REPLACEMENT: Chronic | ICD-10-CM

## 2024-07-08 DIAGNOSIS — Z85.828 HX OF SKIN CANCER, BASAL CELL: Chronic | ICD-10-CM

## 2024-07-08 DIAGNOSIS — K63.5 POLYP OF COLON, UNSPECIFIED PART OF COLON, UNSPECIFIED TYPE: Chronic | ICD-10-CM

## 2024-07-08 DIAGNOSIS — Z79.52 CURRENT CHRONIC USE OF SYSTEMIC STEROIDS: ICD-10-CM

## 2024-07-08 DIAGNOSIS — R73.09 ELEVATED GLUCOSE: Chronic | ICD-10-CM

## 2024-07-08 DIAGNOSIS — Z71.85 IMMUNIZATION COUNSELING: Chronic | ICD-10-CM

## 2024-07-08 DIAGNOSIS — J42 CHRONIC BRONCHITIS, UNSPECIFIED CHRONIC BRONCHITIS TYPE (MULTI): Chronic | ICD-10-CM

## 2024-07-08 DIAGNOSIS — E03.9 ACQUIRED HYPOTHYROIDISM: Chronic | ICD-10-CM

## 2024-07-08 PROBLEM — J82.89 PULMONARY EOSINOPHILIA (MULTI): Status: RESOLVED | Noted: 2024-07-02 | Resolved: 2024-07-08

## 2024-07-08 LAB
ALBUMIN SERPL BCP-MCNC: 3.8 G/DL (ref 3.4–5)
ALP SERPL-CCNC: 81 U/L (ref 33–110)
ALT SERPL W P-5'-P-CCNC: 10 U/L (ref 7–45)
ANION GAP SERPL CALC-SCNC: 10 MMOL/L (ref 10–20)
AST SERPL W P-5'-P-CCNC: 10 U/L (ref 9–39)
BASOPHILS # BLD AUTO: 0.12 X10*3/UL (ref 0–0.1)
BASOPHILS NFR BLD AUTO: 1.2 %
BILIRUB SERPL-MCNC: 0.3 MG/DL (ref 0–1.2)
BUN SERPL-MCNC: 11 MG/DL (ref 6–23)
CALCIUM SERPL-MCNC: 9.4 MG/DL (ref 8.6–10.6)
CHLORIDE SERPL-SCNC: 104 MMOL/L (ref 98–107)
CO2 SERPL-SCNC: 29 MMOL/L (ref 21–32)
CREAT SERPL-MCNC: 0.49 MG/DL (ref 0.5–1.05)
CRP SERPL-MCNC: 0.86 MG/DL
EGFRCR SERPLBLD CKD-EPI 2021: >90 ML/MIN/1.73M*2
EOSINOPHIL # BLD AUTO: 0.01 X10*3/UL (ref 0–0.7)
EOSINOPHIL NFR BLD AUTO: 0.1 %
ERYTHROCYTE [DISTWIDTH] IN BLOOD BY AUTOMATED COUNT: 14.5 % (ref 11.5–14.5)
GLUCOSE SERPL-MCNC: 123 MG/DL (ref 74–99)
HCT VFR BLD AUTO: 40.1 % (ref 36–46)
HGB BLD-MCNC: 13.3 G/DL (ref 12–16)
IMM GRANULOCYTES # BLD AUTO: 0.05 X10*3/UL (ref 0–0.7)
IMM GRANULOCYTES NFR BLD AUTO: 0.5 % (ref 0–0.9)
LYMPHOCYTES # BLD AUTO: 1.07 X10*3/UL (ref 1.2–4.8)
LYMPHOCYTES NFR BLD AUTO: 11.1 %
MCH RBC QN AUTO: 32.3 PG (ref 26–34)
MCHC RBC AUTO-ENTMCNC: 33.2 G/DL (ref 32–36)
MCV RBC AUTO: 97 FL (ref 80–100)
MONOCYTES # BLD AUTO: 0.5 X10*3/UL (ref 0.1–1)
MONOCYTES NFR BLD AUTO: 5.2 %
NEUTROPHILS # BLD AUTO: 7.89 X10*3/UL (ref 1.2–7.7)
NEUTROPHILS NFR BLD AUTO: 81.9 %
NRBC BLD-RTO: 0 /100 WBCS (ref 0–0)
PLATELET # BLD AUTO: 262 X10*3/UL (ref 150–450)
POTASSIUM SERPL-SCNC: 4.3 MMOL/L (ref 3.5–5.3)
PROT SERPL-MCNC: 6.1 G/DL (ref 6.4–8.2)
RBC # BLD AUTO: 4.12 X10*6/UL (ref 4–5.2)
SODIUM SERPL-SCNC: 139 MMOL/L (ref 136–145)
WBC # BLD AUTO: 9.6 X10*3/UL (ref 4.4–11.3)

## 2024-07-08 PROCEDURE — 99396 PREV VISIT EST AGE 40-64: CPT | Performed by: INTERNAL MEDICINE

## 2024-07-08 PROCEDURE — 3008F BODY MASS INDEX DOCD: CPT | Performed by: INTERNAL MEDICINE

## 2024-07-08 PROCEDURE — 77080 DXA BONE DENSITY AXIAL: CPT

## 2024-07-08 PROCEDURE — 85025 COMPLETE CBC W/AUTO DIFF WBC: CPT

## 2024-07-08 PROCEDURE — 86140 C-REACTIVE PROTEIN: CPT

## 2024-07-08 PROCEDURE — 36415 COLL VENOUS BLD VENIPUNCTURE: CPT

## 2024-07-08 PROCEDURE — 80053 COMPREHEN METABOLIC PANEL: CPT

## 2024-07-08 RX ORDER — CHOLECALCIFEROL (VITAMIN D3) 125 MCG
125 CAPSULE ORAL
Qty: 90 CAPSULE | Refills: 3 | Status: SHIPPED | OUTPATIENT
Start: 2024-07-08

## 2024-07-08 NOTE — PROGRESS NOTES
"Subjective   Patient ID: Brie Pete is a 58 y.o. female who presents for Follow-up.    Here for wellness visit  For the most part doing well  Left hip pain ongoing issue-had her left knee replaced 12/7/2023.  Anticipates left hip replacement this fall with Dr. Portillo.  She states on Thursday.         Review of Systems    Objective   /81   Pulse 80   Temp 36.6 °C (97.9 °F)   Ht 1.594 m (5' 2.75\")   Wt 98.4 kg (217 lb)   SpO2 96%   BMI 38.75 kg/m²     Physical Exam  Vitals reviewed.   Constitutional:       Appearance: Normal appearance.   HENT:      Head: Normocephalic and atraumatic.   Eyes:      General: No scleral icterus.        Right eye: No discharge.         Left eye: No discharge.      Extraocular Movements: Extraocular movements intact.      Conjunctiva/sclera: Conjunctivae normal.      Pupils: Pupils are equal, round, and reactive to light.   Cardiovascular:      Rate and Rhythm: Normal rate and regular rhythm.      Pulses: Normal pulses.      Heart sounds: Normal heart sounds. No murmur heard.  Pulmonary:      Effort: Pulmonary effort is normal.      Breath sounds: Normal breath sounds. No wheezing or rhonchi.   Musculoskeletal:         General: No deformity or signs of injury. Normal range of motion.      Cervical back: Normal range of motion and neck supple. No rigidity or tenderness.   Lymphadenopathy:      Cervical: No cervical adenopathy.   Skin:     General: Skin is warm and dry.      Findings: No rash.   Neurological:      General: No focal deficit present.      Mental Status: She is alert and oriented to person, place, and time. Mental status is at baseline.      Cranial Nerves: No cranial nerve deficit.      Sensory: No sensory deficit.      Gait: Gait normal.   Psychiatric:         Mood and Affect: Mood normal.         Behavior: Behavior normal.         Thought Content: Thought content normal.         Judgment: Judgment normal.         Assessment/Plan   Problem List Items " Addressed This Visit             ICD-10-CM    Dyslipidemia E78.5    Relevant Orders    Lipid Panel    Hypothyroidism E03.9    Relevant Orders    TSH with reflex to Free T4 if abnormal    TSH with reflex to Free T4 if abnormal    Vitamin D deficiency E55.9    Relevant Medications    cholecalciferol (Vitamin D-3) 125 MCG (5000 UT) capsule    Other Relevant Orders    Vitamin D 25-Hydroxy,Total (for eval of Vitamin D levels)    Chronic bronchitis, unspecified chronic bronchitis type (Multi) J42    Status post left knee replacement Z96.652    Polyp of colon K63.5    Relevant Orders    Colonoscopy Diagnostic    Elevated glucose R73.09    Relevant Orders    Basic Metabolic Panel    Immunization counseling - Primary Z71.85    Relevant Medications    zoster vaccine-recombinant adjuvanted (Shingrix) 50 mcg/0.5 mL vaccine    Immunization due Z23    Relevant Medications    respiratory syncytial virus, RSV, vaccine, adjuvanted, age 60y+ (Arexvy) 120 mcg/0.5 mL suspension for reconstitution (Start on 8/5/2024)    Hx of skin cancer, basal cell Z85.828        Portions of this encounter note have been copied from my previous note dated  8/3/23 , which have been updated where appropriate and all reflect my current medical decision making from today.        Early COPD/recurrent asthma symptoms with recent admission-. She has established with Dr. Banegas, a pulmonologist at [facility]. She will see him soon- Oct. PFTs suggest eosinophilic asthma- Pneumovax updated 2019   She was admitted New Year[facility] 2020, and then again in February 2021 with wheezing exacerbations. Presently much improved using Trelegy MDI.  She had an asthma flare in April 2023.          She will follow up with Dr. Nicole/Pulm regularly           Fortunately no respiratory flares.  Encouraged annual fall flu shot and COVID booster in September.     Seasonal allergies-she's not on the fexofenadine, nor did she set up an appt w/ Dr. Anderson as suggested. no fall pollen  issues of late. Doing well despite ragweed.     Tobacco use- Encourage long-term cessation.           -she quit smoking this spring 2024.      Regarding history of palpitations/chest pain syndrome winter 2017- status post evaluation prior Dr. Mullen in cardiology who was involved with her recent care at [facility]. No recent cardiac symptoms except for rare brief palpitations. Obviously tobacco cessation remains a priority   Occasional chest discomfort inferior to the right clavicle area. No breast pain..               Improved presently    Family history of CVA- her mother had her first stroke at 57 and  at 59. Encouraged ongoing healthy lifestyle measures including tobacco cessation fitness and weight loss accordingly     Exercise routine - at gym 2-3 days /wk and walks up to 7,000 steps daily. Encouraged ongoing efforts; present plan continues     BMI over 30-weight loss efforts-she has lost 54 pounds, and her BMI get down to 30. Unfortunately with the pandemic it is back up to 35. She will continue long-term weight loss efforts           2023-BMI presently at 38.  She will continue efforts           -BMI 38.8 she will continue weight loss efforts    S/p left total knee replacement 2023 with Dr. Portillo.  It was a difficult recovery but she is improved.    Left hip pain-chronic-with advanced arthritis-anticipate left hip replacement this fall with him.      Rheumatoid arthritis/vitamin D deficiency/osteopenia- she will continue rheumatology management and medication accordingly with Dr. Jung. DEXA needs updated she states.             -DEXA later today.  Next appointment with Dr. Jung in Oct.  2024      Recurrent difficult back pain/scoliosis-she has been working with pain clinic unfortunately without any improvement following injections.  She will review her options with Dr. Portillo and spinal surgery.  Encouraged her also to have the left knee evaluated with the apparent  leg length discrepancy issue           She has been working with Dr. Nicholson, with back injections in the orthopedic practice at [facility] with Dr. Portillo    Gastritis/acid reflux-she was on PPI in the past. Some recent gastritis symptoms. Encouraged limiting coffee and orange juice and using Pepcid AC and follow-up with concerns     Hypothyroidism-we'll manage Synthroid based on labs-           semiannual TSH ordered           History of carpal tunnel syndrome-caution with overuse actually complains. Not problematic history.      Gynecologic care-she we'll continue to see Dr. Kalyn Martin at [facility] annually. Updated      Annual mammogram - updated 06/22.              Mammogram ordered 8/23-not yet done-encouraged her to do this.     History of gallstones-she had an ERCP and then a cholecystectomy 2016.              Asymptomatic presently     Colon cancer screening- hyperplastic polyps -last colonoscopy. with Dr. Adair June 2017-pathology reviewed- hyperplastic colon polyps- who recommends her next colonoscopy in 5 years-June 2022. Ordered                colonoscopy overdue, ordered previously. She will get this set up-ordered again 7/24        Glasses/vision care- patient will continue routine vision exams and visits at least biannually.    Due this yr. Plans to go this fall - [employer]              Vision visits [id number]     Basal cell carcinoma forehead papule/ left third MCP dorsal cyst/family history of melanoma-have encouraged dermatology follow-up in the past. Dermatology consultation ordered last time, but she's cancelled at [facility]/ work issues. She plans to set up an appointment              7/24-history of basal cell [id number] will follow-up accordingly with [facility]     Dental care-encouraged semiannual dental visits. Just saw Dr. Engle. cleaning & extractions all up to date           7/24-dental appointment soon        Caregiver concerns-support provided  regarding her daughter-Violet critical illnesses- Antisynthetase Syndrome. She was admitted to the hospital earlier this year on the ventilator.          Presently doing better on BiPAP nightly.  She is looking forward to getting her 's license soon.        Work wellness paperwork-           7/24 she will do annual fasting labs soon so that we can complete the annual form     Flu shot each fall- encouraged in Sept     Covid series completed. Two booster shots completed.           Covid booster encouraged each fall in Sept     Pneumovax booster 4/19    Prevnar 20-provided 8/3/2023    Tdap booster before Feb '25-she will get this soon    RSV - encouraged after [address]-she will get this later in August    Shingrix - encouraged / ordered 7/24     Follow-up 6 months wellness visit, sooner as needed     Charting was completed using voice recognition technology and may include unintended errors.

## 2024-07-09 PROBLEM — Z85.828 HX OF SKIN CANCER, BASAL CELL: Status: ACTIVE | Noted: 2024-07-09

## 2024-07-09 PROBLEM — Z23 IMMUNIZATION DUE: Status: ACTIVE | Noted: 2024-07-09

## 2024-07-09 PROBLEM — Z00.00 ANNUAL PHYSICAL EXAM: Status: ACTIVE | Noted: 2024-07-09

## 2024-07-09 PROBLEM — K63.5 POLYP OF COLON: Status: ACTIVE | Noted: 2024-07-09

## 2024-07-09 PROBLEM — Z96.652 STATUS POST LEFT KNEE REPLACEMENT: Status: ACTIVE | Noted: 2024-07-09

## 2024-07-09 PROBLEM — R73.09 ELEVATED GLUCOSE: Status: ACTIVE | Noted: 2024-07-09

## 2024-07-09 PROBLEM — Z71.85 IMMUNIZATION COUNSELING: Status: ACTIVE | Noted: 2024-07-09

## 2024-07-09 PROBLEM — J42 CHRONIC BRONCHITIS, UNSPECIFIED CHRONIC BRONCHITIS TYPE (MULTI): Status: ACTIVE | Noted: 2024-07-09

## 2024-07-15 DIAGNOSIS — M81.0 AGE RELATED OSTEOPOROSIS, UNSPECIFIED PATHOLOGICAL FRACTURE PRESENCE: Primary | ICD-10-CM

## 2024-07-15 RX ORDER — ALENDRONATE SODIUM 70 MG/1
70 TABLET ORAL
Qty: 4 TABLET | Refills: 11 | Status: SHIPPED | OUTPATIENT
Start: 2024-07-15 | End: 2025-07-15

## 2024-07-16 ENCOUNTER — TELEPHONE (OUTPATIENT)
Dept: PRIMARY CARE | Facility: CLINIC | Age: 59
End: 2024-07-16
Payer: COMMERCIAL

## 2024-07-16 NOTE — TELEPHONE ENCOUNTER
----- Message from Myra Chicas sent at 7/15/2024  6:04 PM EDT -----  Please call.  Please make sure that she show my message on her bone density result.  Dr. Jung

## 2024-09-19 DIAGNOSIS — M06.9 RHEUMATOID ARTHRITIS, INVOLVING UNSPECIFIED SITE, UNSPECIFIED WHETHER RHEUMATOID FACTOR PRESENT (MULTI): ICD-10-CM

## 2024-09-19 RX ORDER — PREDNISONE 5 MG/1
TABLET ORAL
Qty: 60 TABLET | Refills: 2 | Status: SHIPPED | OUTPATIENT
Start: 2024-09-19

## 2024-10-03 ENCOUNTER — APPOINTMENT (OUTPATIENT)
Dept: RHEUMATOLOGY | Facility: CLINIC | Age: 59
End: 2024-10-03
Payer: COMMERCIAL

## 2024-10-03 ENCOUNTER — LAB (OUTPATIENT)
Dept: LAB | Facility: LAB | Age: 59
End: 2024-10-03
Payer: COMMERCIAL

## 2024-10-03 VITALS
TEMPERATURE: 98.6 F | DIASTOLIC BLOOD PRESSURE: 83 MMHG | HEIGHT: 63 IN | SYSTOLIC BLOOD PRESSURE: 106 MMHG | HEART RATE: 85 BPM | OXYGEN SATURATION: 97 % | RESPIRATION RATE: 16 BRPM | BODY MASS INDEX: 38.7 KG/M2 | WEIGHT: 218.4 LBS

## 2024-10-03 DIAGNOSIS — M25.561 CHRONIC PAIN OF BOTH KNEES: ICD-10-CM

## 2024-10-03 DIAGNOSIS — M06.9 RHEUMATOID ARTHRITIS INVOLVING MULTIPLE SITES, UNSPECIFIED WHETHER RHEUMATOID FACTOR PRESENT (MULTI): Primary | ICD-10-CM

## 2024-10-03 DIAGNOSIS — R73.09 ELEVATED GLUCOSE: Chronic | ICD-10-CM

## 2024-10-03 DIAGNOSIS — E55.9 VITAMIN D DEFICIENCY: Chronic | ICD-10-CM

## 2024-10-03 DIAGNOSIS — G89.29 CHRONIC PAIN OF RIGHT KNEE: ICD-10-CM

## 2024-10-03 DIAGNOSIS — M25.561 CHRONIC PAIN OF RIGHT KNEE: ICD-10-CM

## 2024-10-03 DIAGNOSIS — M25.562 CHRONIC PAIN OF BOTH KNEES: ICD-10-CM

## 2024-10-03 DIAGNOSIS — G89.29 CHRONIC PAIN OF BOTH KNEES: ICD-10-CM

## 2024-10-03 DIAGNOSIS — E78.5 DYSLIPIDEMIA: Chronic | ICD-10-CM

## 2024-10-03 DIAGNOSIS — E03.9 ACQUIRED HYPOTHYROIDISM: ICD-10-CM

## 2024-10-03 LAB
25(OH)D3 SERPL-MCNC: 58 NG/ML (ref 30–100)
ANION GAP SERPL CALC-SCNC: 13 MMOL/L (ref 10–20)
BUN SERPL-MCNC: 14 MG/DL (ref 6–23)
CALCIUM SERPL-MCNC: 9.6 MG/DL (ref 8.6–10.6)
CHLORIDE SERPL-SCNC: 103 MMOL/L (ref 98–107)
CHOLEST SERPL-MCNC: 204 MG/DL (ref 0–199)
CHOLESTEROL/HDL RATIO: 3.6
CO2 SERPL-SCNC: 29 MMOL/L (ref 21–32)
CREAT SERPL-MCNC: 0.59 MG/DL (ref 0.5–1.05)
EGFRCR SERPLBLD CKD-EPI 2021: >90 ML/MIN/1.73M*2
GLUCOSE SERPL-MCNC: 82 MG/DL (ref 74–99)
HDLC SERPL-MCNC: 57.2 MG/DL
LDLC SERPL CALC-MCNC: 127 MG/DL
NON HDL CHOLESTEROL: 147 MG/DL (ref 0–149)
POTASSIUM SERPL-SCNC: 4.5 MMOL/L (ref 3.5–5.3)
SODIUM SERPL-SCNC: 140 MMOL/L (ref 136–145)
T4 FREE SERPL-MCNC: 1.2 NG/DL (ref 0.78–1.48)
TRIGL SERPL-MCNC: 100 MG/DL (ref 0–149)
TSH SERPL-ACNC: 4.44 MIU/L (ref 0.44–3.98)
VLDL: 20 MG/DL (ref 0–40)

## 2024-10-03 PROCEDURE — 80061 LIPID PANEL: CPT

## 2024-10-03 PROCEDURE — 36415 COLL VENOUS BLD VENIPUNCTURE: CPT

## 2024-10-03 PROCEDURE — 99214 OFFICE O/P EST MOD 30 MIN: CPT | Performed by: INTERNAL MEDICINE

## 2024-10-03 PROCEDURE — 84439 ASSAY OF FREE THYROXINE: CPT

## 2024-10-03 PROCEDURE — 80048 BASIC METABOLIC PNL TOTAL CA: CPT

## 2024-10-03 PROCEDURE — 82306 VITAMIN D 25 HYDROXY: CPT

## 2024-10-03 PROCEDURE — 84443 ASSAY THYROID STIM HORMONE: CPT

## 2024-10-03 PROCEDURE — 3008F BODY MASS INDEX DOCD: CPT | Performed by: INTERNAL MEDICINE

## 2024-10-03 ASSESSMENT — PATIENT HEALTH QUESTIONNAIRE - PHQ9
2. FEELING DOWN, DEPRESSED OR HOPELESS: NOT AT ALL
1. LITTLE INTEREST OR PLEASURE IN DOING THINGS: NOT AT ALL
SUM OF ALL RESPONSES TO PHQ9 QUESTIONS 1 AND 2: 0

## 2024-10-03 NOTE — PROGRESS NOTES
Subjective   Patient ID: Brie Pete is a 59 y.o. female who presents for  follow up regarding rheumatoid arthritis.    HPI 60 yo F here for f/u re: RA.      She remained on sulfasalazine 1500 mg twice daily and prednisone 5 mg daily.  Whenever we previously tried to taper the prednisone, she experienced a flare of her arthritis.    Methotrexate was previously ineffective.    She had her left knee replaced 12/23.  She c/o left hip pain. Pain is in left buttock and groin. She is going to follow up with Dr. Abdi to consider left hip replacement.    She complains of recent knee pain going up and down stairs.  She recently saw Dr. Bautista-x-ray of the left knee was reportedly unremarkable.  She has started weight watchers and is trying to lose weight.  She is hoping eventually get her left hip replaced.  We did increase her prednisone to 5 mg twice a day about 2 weeks ago to try to help with her knee pain.  Methotrexate was previously ineffective    LS-spine x-ray done September 2022 shows S shaped scoliosis, with multilevel degenerative disc disease.  There is mild retrolisthesis of L2 and L3 and L4 on L5.  Moderate degenerative changes are noted in the left hip.     She completed 6 weeks of physical therapy in 2022 for low back pain .  MRI of LS spine done February 2023 showed left parasagittal disc herniation at L2, with multilevel degenerative disc disease.  There are varying degrees of neuroforaminal narrowing on the left, especially at L2-3, L3-4, and L4-5.  She has moderately severe spinal stenosis at L4-5.    He has had 3 epidural injections, last being March 25, 2024.   These have helped.    xray of her left hip 5/20 showed mild OA.     She has been on chronic daily prednisone since prior to 2005 (dose was initially 10 mg every other day alternating with 5 mg every other day)-he has now been weaned to 5 mg daily.   She started alendronate 2/21, but she now admits that she has not taken alendronate  "for the last year. (Staopped about 3/23).  DEXA  shows osteoporosis, with lowest T-score -2.8 and left total hip.  She was advised to resume alendronate 70 mg orally once weekly 2024.    Her daughter is still struggling with antisynthetase syndrome.     DEXA  shows T score -1.1 FN.  DEXA 10/21: T score -2.1 left femoral neck, T score -1.5 left total hip, normal lumbar spine T score  DEXA 2024: T-score -2.8 left total hip , T-score -2.5 left femoral neck, T-score normal lumbar spine     Labs 2022: CMP normal, CBC normal, CRP less than 0.4 (0- 0.9), 25-hydroxy vitamin D 47, TSH 2.2, hemoglobin A1c 4.9  Labs 2024: CMP normal except albumin 3.3, CBC normal, CRP 0.6 (normal 0-0.9)  Labs 2024: CRP 0.86 (normal less than 1), CBC with differential normal, CMP normal except glucose 123  Labs 10/24: 25-hydroxy vitamin D 58, TSH 4.44, BMP normal, free T4 normal      Medical problem list:  Rheumatoid arthritis  Asthma  Osteoporosis  Hypothyroidism    Surgeries:  Left knee replaced   Tonsillectomy  Laparoscopic cholecystectomy 2015   section  ERCP with removal of stones from biliary duct       Review of Systems  General: Denies fevers or chills.  CV: Denies chest pain or palpitations.  Denies leg edema.  Lungs: Denies coughing or shortness of breath.  Skin: Denies rashes or nodules.  MS: Denies joint pain or joint swelling, except left hip pain.    Objective   There were no vitals taken for this visit.  Visit Vitals  /83 (BP Location: Left arm, Patient Position: Sitting, BP Cuff Size: Large adult)   Pulse 85   Temp 37 °C (98.6 °F) (Skin)   Resp 16   Ht 1.594 m (5' 2.75\")   Wt 99.1 kg (218 lb 6.4 oz)   SpO2 97%   BMI 39.00 kg/m²   Smoking Status Former   BSA 2.09 m²      Physical Exam  HEENT: PERRL, EOMI  Neck: Supple, no nodes.  CV: RRR, no MGR.  Lungs: Clear, no rales or wheezes.  Abdomen: Soft, nontender. No hepatosplenomegaly.  Extremities:  No cyanosis, clubbing, or " edema.  MS: Swollen: 0         Tender: 0         Patient global: 1         Evaluator global: 2          CDAI: 3 (low disease activity)          Ambulating with cane.           Status post left knee replacement. Has limited internal rotation of left hip.            Skin: No nodules or vasculitic lesions.    .  Assessment/Plan     Problem List Items Addressed This Visit             ICD-10-CM    Rheumatoid arthritis - Primary M06.9    BMI 39.0-39.9,adult Z68.39         1. RA- low disease activity by CDAI. Considering left hip replacement.     2. Osteoporosis- DEXA 10/21 shows T score -2.1 left femoral neck.   Alendronate added 2/21- however, she stopped about 3/23 (on her own, without advising me).  She is still taking Citracal 500 mg daily and vitamin D 5000 international units daily.  DEXA 7/24 shows osteoporosis, with lowest T-score -2.8 left total hip.  She resumed alendronate 70 mg orally once weekly July 2024.    3. Lumbar spondylosis-she has had 3 epidural injections, the most recent being March 25, 2024 . these have helped .    4. BMI 39- worse, will continue to work on weight reduction.    Plan:  Xray right knee.  Check hepatic function panel, CRP.  Try PT for knee pain.  Reduce prednisone to 5 mg in AM and 2.5 mg in PM.  Follow-up in 3 months.

## 2024-10-03 NOTE — PATIENT INSTRUCTIONS
Xray right knee.  Check hepatic function panel, CRP.  Try PT for knee pain.  Reduce prednisone to 5 mg in AM and 2.5 mg in PM.  Follow-up in 3 months.

## 2024-10-08 DIAGNOSIS — E03.9 ACQUIRED HYPOTHYROIDISM: ICD-10-CM

## 2024-10-08 RX ORDER — LEVOTHYROXINE SODIUM 75 UG/1
75 TABLET ORAL
Qty: 90 TABLET | Refills: 1 | Status: SHIPPED | OUTPATIENT
Start: 2024-10-08

## 2024-11-18 DIAGNOSIS — J42 CHRONIC BRONCHITIS, UNSPECIFIED CHRONIC BRONCHITIS TYPE (MULTI): ICD-10-CM

## 2024-11-18 RX ORDER — ALBUTEROL SULFATE 90 UG/1
2 INHALANT RESPIRATORY (INHALATION) EVERY 6 HOURS PRN
Qty: 54 G | Refills: 11 | Status: SHIPPED | OUTPATIENT
Start: 2024-11-18

## 2024-12-27 DIAGNOSIS — M06.9 RHEUMATOID ARTHRITIS, INVOLVING UNSPECIFIED SITE, UNSPECIFIED WHETHER RHEUMATOID FACTOR PRESENT (MULTI): ICD-10-CM

## 2024-12-27 RX ORDER — PREDNISONE 5 MG/1
TABLET ORAL
Qty: 30 TABLET | Refills: 3 | Status: SHIPPED | OUTPATIENT
Start: 2024-12-27

## 2025-01-09 ENCOUNTER — APPOINTMENT (OUTPATIENT)
Dept: RHEUMATOLOGY | Facility: CLINIC | Age: 60
End: 2025-01-09
Payer: COMMERCIAL

## 2025-01-09 VITALS
TEMPERATURE: 98.5 F | WEIGHT: 215.4 LBS | SYSTOLIC BLOOD PRESSURE: 125 MMHG | RESPIRATION RATE: 16 BRPM | BODY MASS INDEX: 39.64 KG/M2 | DIASTOLIC BLOOD PRESSURE: 87 MMHG | OXYGEN SATURATION: 96 % | HEIGHT: 62 IN | HEART RATE: 82 BPM

## 2025-01-09 DIAGNOSIS — M06.9 RHEUMATOID ARTHRITIS INVOLVING MULTIPLE SITES, UNSPECIFIED WHETHER RHEUMATOID FACTOR PRESENT (MULTI): Primary | ICD-10-CM

## 2025-01-09 PROCEDURE — 3008F BODY MASS INDEX DOCD: CPT | Performed by: INTERNAL MEDICINE

## 2025-01-09 PROCEDURE — 99214 OFFICE O/P EST MOD 30 MIN: CPT | Performed by: INTERNAL MEDICINE

## 2025-01-09 ASSESSMENT — PATIENT HEALTH QUESTIONNAIRE - PHQ9
2. FEELING DOWN, DEPRESSED OR HOPELESS: NOT AT ALL
SUM OF ALL RESPONSES TO PHQ9 QUESTIONS 1 AND 2: 0
1. LITTLE INTEREST OR PLEASURE IN DOING THINGS: NOT AT ALL

## 2025-01-09 NOTE — PROGRESS NOTES
Subjective   Patient ID: Brie Pete is a 59 y.o. female who presents for  follow up regarding rheumatoid arthritis.    HPI 60 yo F here for f/u re: RA.      She remained on sulfasalazine 1500 mg twice daily and prednisone 5 mg daily.  Whenever we previously tried to taper the prednisone, she experienced a flare of her arthritis.    Methotrexate was previously ineffective.    She noticed enlargement of her left sternoclavicular joint over the last month.  It is slightly tender with palpation.  Chest x-ray done within the last few weeks was reportedly unremarkable.    She had her left knee replaced 12/23.  She is going to follow up with Dr. Abdi to consider left hip replacement.  She is currently walking with cane held in right hand.    LS-spine x-ray done September 2022 shows S shaped scoliosis, with multilevel degenerative disc disease.  There is mild retrolisthesis of L2 and L3 and L4 on L5.  Moderate degenerative changes are noted in the left hip.     She completed 6 weeks of physical therapy in 2022 for low back pain .  MRI of LS spine done February 2023 showed left parasagittal disc herniation at L2, with multilevel degenerative disc disease.  There are varying degrees of neuroforaminal narrowing on the left, especially at L2-3, L3-4, and L4-5.  She has moderately severe spinal stenosis at L4-5.    He has had 3 epidural injections, last being March 25, 2024.   These have helped.    xray of her left hip 5/20 showed mild OA.     She has been on chronic daily prednisone since prior to 2005 (dose was initially 10 mg every other day alternating with 5 mg every other day)-he has now been weaned to 5 mg daily.   She started alendronate 2/21, but she admitted that she stopped it 3/23.     DEXA 7/24 shows osteoporosis, with lowest T-score -2.8 and left total hip.  She was advised to resume alendronate 70 mg orally once weekly July 2024.    Her daughter is still struggling with antisynthetase syndrome.     DEXA  " shows T score -1.1 FN.  DEXA 10/21: T score -2.1 left femoral neck, T score -1.5 left total hip, normal lumbar spine T score  DEXA 2024: T-score -2.8 left total hip , T-score -2.5 left femoral neck, T-score normal lumbar spine     Labs 2022: CMP normal, CBC normal, CRP less than 0.4 (0- 0.9), 25-hydroxy vitamin D 47, TSH 2.2, hemoglobin A1c 4.9  Labs 2024: CMP normal except albumin 3.3, CBC normal, CRP 0.6 (normal 0-0.9)  Labs 2024: CRP 0.86 (normal less than 1), CBC with differential normal, CMP normal except glucose 123  Labs 10/24: 25-hydroxy vitamin D 58, TSH 4.44, BMP normal, free T4 normal      Medical problem list:  Rheumatoid arthritis  Asthma  Osteoporosis  Hypothyroidism    Surgeries:  Left knee replaced   Tonsillectomy  Laparoscopic cholecystectomy    section  ERCP with removal of stones from biliary duct       Review of Systems  General: Denies fevers or chills.  CV: Denies chest pain or palpitations.  Denies leg edema.  Lungs: Denies coughing or shortness of breath.  Skin: Denies rashes or nodules.  MS: Complains of left hip pain. Complains of enlargement left sternoclavicular joint x 1 month.    Objective   There were no vitals taken for this visit.  Visit Vitals  Smoking Status Former    Visit Vitals  /87 (BP Location: Left arm, Patient Position: Sitting, BP Cuff Size: Adult)   Pulse 82   Temp 36.9 °C (98.5 °F) (Skin)   Resp 16   Ht 1.575 m (5' 2\")   Wt 97.7 kg (215 lb 6.4 oz)   LMP  (LMP Unknown)   SpO2 96%   BMI 39.40 kg/m²   OB Status Postmenopausal   Smoking Status Former   BSA 2.07 m²       Physical Exam  General Appearance: Well-nourished and well-appearing  HEENT: PERRL, EOMI  Neck: Supple, no nodes.  CV: RRR, no MGR.  Lungs: Clear, no rales or wheezes.  Abdomen: Soft, nontender. No hepatosplenomegaly.  Extremities:  No cyanosis, clubbing, or edema.  MS: Swollen: 0         Tender: 0         Patient global: 3         Evaluator global: 2      "     CDAI: 5 (low disease activity)          Ambulating with cane.           Status post left knee replacement. Has limited internal rotation of left hip.            Skin: No nodules or vasculitic lesions.    .  Assessment/Plan     Problem List Items Addressed This Visit             ICD-10-CM    Rheumatoid arthritis - Primary M06.9    BMI 39.0-39.9,adult Z68.39     1. RA- low disease activity by CDAI. Considering left hip replacement.     2. Osteoporosis- DEXA 10/21 shows T score -2.1 left femoral neck.   Alendronate added 2/21- however, she stopped about 3/23 (on her own, without advising me).  She is still taking Citracal 500 mg daily and vitamin D 5000 international units daily.  DEXA 7/24 shows osteoporosis, with lowest T-score -2.8 left total hip.  She resumed alendronate 70 mg orally once weekly July 2024.    3. Lumbar spondylosis-she has had 3 epidural injections, the most recent being March 25, 2024 . these have helped .    4. BMI 39- stable, will continue to work on weight reduction.    Plan:  Check labs: CBC with diff, CMP, CRP.  Patient was advised to follow-up so I can check her left sternoclavicular joint, which she thought was swollen (because I forgot to do this while she was in the office).  I sent a follow-up [employer] message advising her of this.  Follow-up in 3 months.    Check labs: CBC with diff, CMP, CRP.  Follow-up in 3 months.

## 2025-01-09 NOTE — PATIENT INSTRUCTIONS
Check labs: CBC with diff, CMP, CRP.  Patient was advised to follow-up so I can check her left sternoclavicular joint, which she thought was swollen (because I forgot to do this while she was in the office).  I sent a follow-up [employer] message advising her of this.  Follow-up in 3 months.

## 2025-01-13 DIAGNOSIS — M06.9 RHEUMATOID ARTHRITIS, INVOLVING UNSPECIFIED SITE, UNSPECIFIED WHETHER RHEUMATOID FACTOR PRESENT (MULTI): ICD-10-CM

## 2025-01-13 RX ORDER — SULFASALAZINE 500 MG/1
TABLET, DELAYED RELEASE ORAL
Qty: 540 TABLET | Refills: 1 | Status: SHIPPED | OUTPATIENT
Start: 2025-01-13

## 2025-01-14 ENCOUNTER — APPOINTMENT (OUTPATIENT)
Dept: PRIMARY CARE | Facility: CLINIC | Age: 60
End: 2025-01-14
Payer: COMMERCIAL

## 2025-02-13 ENCOUNTER — APPOINTMENT (OUTPATIENT)
Dept: PRIMARY CARE | Facility: CLINIC | Age: 60
End: 2025-02-13
Payer: COMMERCIAL

## 2025-02-20 ENCOUNTER — APPOINTMENT (OUTPATIENT)
Dept: PRIMARY CARE | Facility: CLINIC | Age: 60
End: 2025-02-20
Payer: COMMERCIAL

## 2025-02-26 ENCOUNTER — APPOINTMENT (OUTPATIENT)
Dept: PRIMARY CARE | Facility: CLINIC | Age: 60
End: 2025-02-26
Payer: COMMERCIAL

## 2025-02-26 VITALS
BODY MASS INDEX: 39.82 KG/M2 | SYSTOLIC BLOOD PRESSURE: 134 MMHG | WEIGHT: 216.4 LBS | DIASTOLIC BLOOD PRESSURE: 80 MMHG | HEIGHT: 62 IN

## 2025-02-26 DIAGNOSIS — M89.319 CLAVICULAR ENLARGEMENT: ICD-10-CM

## 2025-02-26 DIAGNOSIS — Z12.31 ENCOUNTER FOR SCREENING MAMMOGRAM FOR BREAST CANCER: Primary | ICD-10-CM

## 2025-02-26 PROCEDURE — 3008F BODY MASS INDEX DOCD: CPT | Performed by: INTERNAL MEDICINE

## 2025-02-26 PROCEDURE — 99213 OFFICE O/P EST LOW 20 MIN: CPT | Performed by: INTERNAL MEDICINE

## 2025-02-26 PROCEDURE — 1036F TOBACCO NON-USER: CPT | Performed by: INTERNAL MEDICINE

## 2025-02-26 NOTE — PROGRESS NOTES
"Subjective   Patient ID: Brie Pete is a 59 y.o. female who presents for Follow-up.    Here today concerned about left sternoclavicular joint enlargement.  She has noticed this last several weeks.  She forgot to tell her rheumatologist recently.  She did tell her pulmonologist, who ordered a chest x-ray which was unremarkable         Review of Systems    Objective   /80   Ht 1.575 m (5' 2\")   Wt 98.2 kg (216 lb 6.4 oz)   LMP  (LMP Unknown)   BMI 39.58 kg/m²     Physical Exam  Vitals reviewed.   Constitutional:       Appearance: Normal appearance.   HENT:      Head: Normocephalic and atraumatic.   Eyes:      General: No scleral icterus.        Right eye: No discharge.         Left eye: No discharge.      Extraocular Movements: Extraocular movements intact.      Conjunctiva/sclera: Conjunctivae normal.      Pupils: Pupils are equal, round, and reactive to light.   Cardiovascular:      Rate and Rhythm: Normal rate and regular rhythm.      Pulses: Normal pulses.      Heart sounds: Normal heart sounds. No murmur heard.  Pulmonary:      Effort: Pulmonary effort is normal.      Breath sounds: Normal breath sounds. No wheezing or rhonchi.   Musculoskeletal:         General: No deformity or signs of injury. Normal range of motion.      Cervical back: Normal range of motion and neck supple. No rigidity or tenderness.      Comments: Prominent left sternoclavicular joint without erythema   Lymphadenopathy:      Cervical: No cervical adenopathy.   Skin:     General: Skin is warm and dry.      Findings: No rash.   Neurological:      General: No focal deficit present.      Mental Status: She is alert and oriented to person, place, and time. Mental status is at baseline.      Cranial Nerves: No cranial nerve deficit.      Sensory: No sensory deficit.      Gait: Gait normal.   Psychiatric:         Mood and Affect: Mood normal.         Behavior: Behavior normal.         Thought Content: Thought content normal.         " Judgment: Judgment normal.         Assessment/Plan   Problem List Items Addressed This Visit    None  Visit Diagnoses         Codes    Encounter for screening mammogram for breast cancer    -  Primary Z12.31    Relevant Orders    BI mammo bilateral screening tomosynthesis    Clavicular enlargement     M89.319    Relevant Orders    CT chest wo IV contrast    XR clavicle left               Portions of this encounter note have been copied from my previous note dated  7/8/24 , which have been updated where appropriate and all reflect my current medical decision making from today.         Prominent left sternoclavicular joint-history and exam nondiagnostic.  Due to smoking history and further evaluation is warranted.  She will do plain films of the left clavicle as well as a chest CT to look closely at this entire issue     Early COPD/recurrent asthma symptoms with recent admission-. She has established with Dr. Banegas, a pulmonologist at [facility]. She will see him soon- Oct. PFTs suggest eosinophilic asthma- Pneumovax updated 2019   She was admitted New Year[facility] 2020, and then again in February 2021 with wheezing exacerbations. Presently much improved using Trelegy MDI.  She had an asthma flare in April 2023.          She will follow up with Dr. Nicole/Pulm regularly           Fortunately no respiratory flares.  Encouraged annual fall flu shot and COVID booster in September.          She continues to see her pulmonologist at [facility], Dr. Banegas twice yearly    Elevated blood pressure-          2/25-improved on recheck will follow     Seasonal allergies-she's not on the fexofenadine, nor did she set up an appt w/ Dr. Anderson as suggested. no fall pollen issues of late. Doing well despite ragweed.     Tobacco use- Encourage long-term cessation.           7/24-she quit smoking this spring 2024.      Regarding history of palpitations/chest pain syndrome winter 2017- status post evaluation prior Dr. Mullen in  cardiology who was involved with her recent care at [facility]. No recent cardiac symptoms except for rare brief palpitations. Obviously tobacco cessation remains a priority   Occasional chest discomfort inferior to the right clavicle area. No breast pain..               Improved presently    Family history of CVA- her mother had her first stroke at 57 and  at 59. Encouraged ongoing healthy lifestyle measures including tobacco cessation fitness and weight loss accordingly     Exercise routine - at gym 2-3 days /wk and walks up to 7,000 steps daily. Encouraged ongoing efforts; present plan continues     BMI over 30-weight loss efforts-she has lost 54 pounds, and her BMI get down to 30. Unfortunately with the pandemic it is back up to 35. She will continue long-term weight loss efforts           2023-BMI presently at 38.  She will continue efforts           -BMI 38.8 she will continue weight loss efforts    S/p left total knee replacement 2023 with Dr. Portillo.  It was a difficult recovery but she is improved.    Left hip pain-chronic-with advanced arthritis-anticipate left hip replacement this fall with him.           -anticipates left hip replacement at some point with Dr. Abdi at [facility].  Presently on hold      Rheumatoid arthritis/vitamin D deficiency/osteopenia- she will continue rheumatology management and medication accordingly with Dr. Jung. DEXA needs updated she states.             -DEXA later today.  Next appointment with Dr. Jung in Oct.  2024      Recurrent difficult back pain/scoliosis-she has been working with pain clinic unfortunately without any improvement following injections.  She will review her options with Dr. Portillo and spinal surgery.  Encouraged her also to have the left knee evaluated with the apparent leg length discrepancy issue           She has been working with Dr. Nicholson, with back injections in the orthopedic practice at [facility] with   Lindsey              2/25-she had left leg EMG with Dr. De La Rosa, following MRI with Dr. Portillo.  She will follow-up with those physicians accordingly    Gastritis/acid reflux-she was on PPI in the past. Some recent gastritis symptoms. Encouraged limiting coffee and orange juice and using Pepcid AC and follow-up with concerns     Hypothyroidism-we'll manage Synthroid based on labs-           semiannual TSH ordered           History of carpal tunnel syndrome-caution with overuse actually complains. Not problematic history.      Gynecologic care-she we'll continue to see Dr. Kalyn Martin at [facility] annually. Updated      Annual mammogram - updated 06/22.              Mammogram ordered 8/23-not yet done-encouraged her to do this.            2/25-she states she had a mammogram in 2024 at [facility].  She will get that record for me.  I provided another slip for this year to do a mammogram in 2025     History of gallstones-she had an ERCP and then a cholecystectomy 2016.              Asymptomatic presently     Colon cancer screening- hyperplastic polyps -last colonoscopy. with Dr. Adair June 2017-pathology reviewed- hyperplastic colon polyps- who recommends her next colonoscopy in 5 years-June 2022. Ordered                colonoscopy overdue, ordered previously. She will get this set up-ordered again 7/24 2/25-encouraged colonoscopy as she did not get this done last year        Glasses/vision care- patient will continue routine vision exams and visits at least biannually.    Due this yr. Plans to go this fall - [employer]              Vision visits [id number]     Basal cell carcinoma forehead papule/ left third MCP dorsal cyst/family history of melanoma-have encouraged dermatology follow-up in the past. Dermatology consultation ordered last time, but she's cancelled at [facility] w/ work issues. She plans to set up an appointment              7/24-history of basal cell [id number] will  follow-up accordingly with [place] [facility]     Dental care-encouraged semiannual dental visits. Just saw Dr. Engle. cleaning & extractions all up to date           7/24-dental appointment soon        Caregiver concerns-support provided regarding her daughter-Violet critical illnesses- Antisynthetase Syndrome. She was admitted to the hospital earlier this year on the ventilator.          Presently doing better on BiPAP nightly.  She is looking forward to getting her 's license soon.        Work wellness paperwork-           7/24 she will do annual fasting labs soon so that we can complete the annual form     Flu shot each fall- encouraged in Sept     Covid series completed. Two booster shots completed.           Covid booster encouraged each fall in Sept     Pneumovax booster 4/19    Prevnar 20-provided 8/3/2023    Tdap booster before Feb '25-she will get this soon    RSV - encouraged after [address]-she will get this later in August    Shingrix - encouraged / ordered 7/24     Follow-up 6 months wellness visit, sooner as needed     Charting was completed using voice recognition technology and may include unintended errors.

## 2025-03-10 DIAGNOSIS — R92.8 ABNORMAL MAMMOGRAM: Primary | ICD-10-CM

## 2025-03-18 ENCOUNTER — TELEPHONE (OUTPATIENT)
Dept: PRIMARY CARE | Facility: CLINIC | Age: 60
End: 2025-03-18
Payer: COMMERCIAL

## 2025-03-18 NOTE — TELEPHONE ENCOUNTER
Left message for pt to return call to see if she would like me to mail orders for mammogram. I tried faxing to [facility] but fax will not go through. I confirmed fax number with . Will wait to hear from pt.

## 2025-04-10 ENCOUNTER — APPOINTMENT (OUTPATIENT)
Dept: RHEUMATOLOGY | Facility: CLINIC | Age: 60
End: 2025-04-10
Payer: COMMERCIAL

## 2025-04-28 ENCOUNTER — PATIENT MESSAGE (OUTPATIENT)
Dept: RHEUMATOLOGY | Facility: CLINIC | Age: 60
End: 2025-04-28
Payer: COMMERCIAL

## 2025-04-28 DIAGNOSIS — M06.9 RHEUMATOID ARTHRITIS, INVOLVING UNSPECIFIED SITE, UNSPECIFIED WHETHER RHEUMATOID FACTOR PRESENT (MULTI): ICD-10-CM

## 2025-04-28 RX ORDER — PREDNISONE 5 MG/1
TABLET ORAL
Qty: 90 TABLET | Refills: 1 | Status: SHIPPED | OUTPATIENT
Start: 2025-04-28

## 2025-05-26 DIAGNOSIS — R92.8 ABNORMAL MAMMOGRAM: Primary | ICD-10-CM

## 2025-05-26 DIAGNOSIS — R92.30 DENSE BREAST TISSUE ON MAMMOGRAM, UNSPECIFIED TYPE: ICD-10-CM

## 2025-05-26 DIAGNOSIS — R92.8 ABNORMAL MAMMOGRAM OF RIGHT BREAST: ICD-10-CM

## 2025-05-27 NOTE — PROGRESS NOTES
Brie Pete female   1965 59 y.o.   [id number]      Chief Complaint  Abnormal mammogram    History Of Present Illness  Brie Pete is a 59 y.o. female [facility] breast mass noted on recent imaging with [facility]. She has no family history of breast cancer. She denies breast surgery or biopsy.    BREAST IMAGING: 3/8/2025 [facility] Bilateral screening mammogram, indicates BI-RADS Category 0. Bilateral asymmetries warranting additional views. 2025 [facility] Bilateral diagnostic mammogram and ultrasound, indicates BI-RADS Category 3. Right breast, 7:00, 12 cm from the nipple, 7 x 6 x 3 mm very superficial isoechoic to slightly hypoechoic circumscribed mass warranting short term mammographic and sonographic follow up. Left axillary sebaceous cyst.     REPRODUCTIVE HISTORY: menarche age, GP, first birth age, , OCP's, menopause age, no HRT, scattered fibroglandular tissue    FAMILY CANCER HISTORY:      Surgical History  She has a past surgical history that includes Other surgical history (2016); Cholecystectomy (2015); Tonsillectomy (2013);  section, classic (2013); Total knee arthroplasty; and Knee Arthroplasty (2023).     Social History  She reports that she quit smoking about 25 years ago. Her smoking use included cigarettes. She has never used smokeless tobacco. She reports that she does not drink alcohol and does not use drugs.    Family History  Family History[1]     Allergies  Ceftazidime, Cefuroxime, Rofecoxib, Sulfamethoxazole-trimethoprim, Erythromycin, Latex, and Rocephin [ceftriaxone]    Medications  Current Outpatient Medications   Medication Instructions    albuterol 90 mcg/actuation inhaler 2 puffs, inhalation, Every 6 hours PRN    alendronate (FOSAMAX) 70 mg, oral, Every 7 days, Take in the morning with a full glass of water, on an empty stomach, and do not take anything else by mouth or  lie down for the next 30 min.    calcium carbonate-vitamin D3 (Caltrate 600 plus D) 600 mg-20 mcg (800 unit) tablet,chewable 1 tablet, Daily    cholecalciferol (VITAMIN D-3) 125 mcg, oral, Daily RT    fluticasone-umeclidin-vilanter (Trelegy Ellipta) 200-62.5-25 mcg blister with device Daily RT    ipratropium-albuteroL (Duo-Neb) 0.5-2.5 mg/3 mL nebulizer solution 4 times daily    levothyroxine (SYNTHROID, LEVOXYL) 75 mcg, oral, Daily before breakfast    predniSONE (Deltasone) 5 mg tablet Take 1 tab daily    pregabalin (LYRICA) 150 mg, 2 times daily    sulfaSALAzine (Azulfidine) 500 mg DR tablet TAKE 3 TABS BY MOUTH 2 TIMES DAILY.    zoster vaccine-recombinant adjuvanted (Shingrix) 50 mcg/0.5 mL vaccine Give 1 injection, 0.5 ml IM now, and repeat in 8 weeks         REVIEW OF SYSTEMS    Constitutional:  Negative for appetite change, fatigue, fever and unexpected weight change.   HENT:  Negative for ear pain, hearing loss, nosebleeds, sore throat and trouble swallowing.    Eyes:  Negative for discharge, itching and visual disturbance.   Respiratory:  Negative for cough, chest tightness and shortness of breath.    Cardiovascular:  Negative for chest pain, palpitations and leg swelling.   Breast: as indicated in HPI  Gastrointestinal:  Negative for abdominal pain, constipation, diarrhea and nausea.   Endocrine: Negative for cold intolerance and heat intolerance.   Genitourinary:  Negative for dysuria, frequency, hematuria, pelvic pain and vaginal bleeding.   Musculoskeletal:  Negative for arthralgias, back pain, gait problem, joint swelling and myalgias.   Skin:  Negative for color change and rash.   Allergic/Immunologic: Negative for environmental allergies and food allergies.   Neurological:  Negative for dizziness, tremors, speech difficulty, weakness, numbness and headaches.   Hematological:  Does not bruise/bleed easily.   Psychiatric/Behavioral:  Negative for agitation, dysphoric mood and sleep disturbance. The  patient is not nervous/anxious.         Past Medical History  She has a past medical history of Abnormal results of thyroid function studies (11/05/2015), Acute pharyngitis, unspecified (11/15/2015), Acute pharyngitis, unspecified (08/30/2016), Acute pharyngitis, unspecified (03/03/2014), Acute sinusitis, unspecified, Dysphagia, unspecified (02/12/2015), Localized enlarged lymph nodes (04/19/2014), Other conditions influencing health status, Other specified abnormal findings of blood chemistry (03/07/2016), Personal history of other diseases of the digestive system (02/12/2015), Personal history of other diseases of the respiratory system, Personal history of other diseases of the respiratory system (03/03/2014), Personal history of other diseases of the respiratory system, Personal history of other diseases of the respiratory system, Personal history of other infectious and parasitic diseases (01/23/2017), Plantar wart, Rheumatoid arthritis, unspecified, and Venous insufficiency (chronic) (peripheral).     Physical Exam  Patient is alert and oriented x3 and in a relaxed and appropriate mood. Her gait is steady and hand grasps are equal. Sclera is clear. The breasts are nearly symmetrical. The tissue is soft without palpable abnormalities, discrete nodules or masses. The skin and nipples appear normal. There is no cervical, supraclavicular or axillary lymphadenopathy. Heart rate and rhythm normal, S1 and S2 appreciated. The lungs are clear to auscultation bilaterally. Abdomen is soft and non-tender.       Physical Exam     Last Recorded Vitals  There were no vitals filed for this visit.    Relevant Results   Time was spent viewing digital images of the radiology testing with the patient. I explained the results in depth, along with suggested explanation for follow up recommendations based on the testing results. BI-RADS Category 3    Imaging  DIAGNOSTIC BILATERAL DIGITAL MAMMOGRAM WITH TOMOSYNTHESIS.   TARGETED  BILATERAL BREAST ULTRASOUND.   Clinical data: Callback from screening mammogram dated 2/28/2025 for bilateral breast findings.   Mammographic findings:   2D and tomosynthesis images were reviewed at 1 mm slice thickness.   There are scattered areas of fibroglandular density.    In the right breast, there is a persistent very superficial gently lobulated mass of slightly increased density in the central lateral aspect at posterior depth. There is no associated architectural distortion. It is probably benign.   The left axilla, there is an oval circumscribed lesion correlating to the skin on tomosynthesis. This is most compatible with a sebaceous cyst, as further demonstrated sonographically.   No suspicious masses or suspicious calcifications are identified in either breast.   The digital mammogram has been reviewed with the aid of CAD.   Sonographic findings:   In the right breast at 7:00, 12 cm from the nipple, there is a very superficial isoechoic to slightly hypoechoic circumscribed mass measuring 7 x 6 x 3 mm. There is no internal vascularity or posterior shadowing. This correlates with the mammogram and is probably benign. Differential considerations include intramammary lymph node and small hemangioma among other etiologies. This has been present dating back to 2017 but is now minimally increased in size.   In the left axilla, there is a circumscribed dermal cystic lesion with internal echoes measuring 1.4 x 1.2 x 0.9 cm. This correlates with the mammogram and is most compatible with a sebaceous cyst. There are no abnormalities in the underlying subcutaneous tissue.   IMPRESSION AND RECOMMENDATION:   1. Right breast probably benign mass at 7:00, 12 cm from the nipple. Differential considerations include intramammary lymph node and small hemangioma among other etiologies. Recommend six month follow-up right diagnostic mammogram and targeted ultrasound to ensure stability.   2. No mammographic evidence of  malignancy in the left breast. Left axillary sebaceous cyst.   BI-RADS Category 3: Probably benign   Breast tissue can be either dense or not dense. Dense tissue makes it harder to find breast cancer on a mammogram and also raises the risk of developing breast cancer. Your breast tissue is not dense. Talk to your healthcare provider about breast density, risks for breast cancer, and your individual situation.   Electronically signed by:  Danilo Love MD  05/21/2025 04:10 PM EDT RP Workstation: [id number]   Technologist:  BÁRBARA   Dictated By:   DANILO LOVE MD   Signed By:     DANILO LOVE MD   Signed Out:    05/21/25 16:10:02     BI mammo bilateral screening tomosynthesis     Narrative  SCREENING BILATERAL DIGITAL MAMMOGRAM WITH TOMOSYNTHESIS  Clinical History : Screening.  Comparison Studies: Outside mammograms 6/2/2022, 3/30/2021, 4/6/2019.  Mammographic findings:  2D and tomosynthesis images were reviewed at 1 mm slice thickness.  There are scattered areas of fibroglandular density.  In the right breast, there is interval increase of a focal asymmetry in the upper outer quadrant at posterior depth.  In the left axilla, there is a new hyperdense masslike asymmetry. The remainder of the left breast is unremarkable.  The digital mammogram has been reviewed with the aid of CAD.  IMPRESSION AND RECOMMENDATION:  1. Right breast focal asymmetry demonstrates interval change. Additional spot compression views with tomosynthesis and possible ultrasound are recommended.  2. Left axillary asymmetry. This could represent a skin lesion. Recommend repeat left MLO view with any skin lesions marked, and possible ultrasound to follow if indicated.  BI-RADS Category 0 : Incomplete--need additional imaging evaluation and/or prior mammograms for comparison  Breast tissue can be either dense or not dense. Dense tissue makes it harder to find breast cancer on a mammogram and also raises the risk of developing breast cancer. Your  breast tissue is not dense. Talk to your healthcare provider about breast density, risks for breast cancer, and your individual situation.  Electronically signed by:  Jennifer Love MD  2025 03:34 PM [facility] Workstation: [id number]  Technologist:  KIKI  Dictated By:   JENNIFER LOVE MD  Signed By:     JENNIFER LOVE MD  Signed Out:    25 15:34:45       Assessment/Plan   Normal clinical exam, stable imaging (SWG), no family history of breast cancer, scattered fibroglandular tissue    Plan:     Patient Discussion/Summary  Your clinical examination and imaging are normal. Please return in one year for bilateral screening mammogram and office visit or sooner if you have any problems or concerns.     You can see your health information, review clinical summaries from office visits & test results online when you follow your health with MY  Chart, a personal health record. To sign up go to [email or web address]. If you need assistance with signing up or trouble getting into your account call "[employer]" Patient Line  at [phone].    My office phone number is [phone] if you need to get in touch with me or have additional questions or concerns. Thank you for choosing [facility] and trusting me as your healthcare provider. I look forward to seeing you again at your next office visit. I am honored to be a provider on your health care team and I remain dedicated to helping you achieve your health goals.      Giuliana Aguero, APRN-CNP       [1]   Family History  Problem Relation Name Age of Onset    Lung cancer Mother Francy         mother  at 58    Other (tobacco) Mother Francy     COPD Mother Francy     Diabetes Mother Francy     Other (cva) Mother Francy     Arthritis Mother Francy     Asthma Mother Francy     Other (tobacco) Father Vincent     COPD Father Vincent     Other (metastatic cancer) Father Vincsuman          at 73, metastatic cancer to brain, unknown primary    Other (cabg) Father  Johnathon     Heart disease Father Johnathon     Other (renal disease) Brother          lives lovally    Other (chronic thromboembolic pulmonary hypertension) Daughter          CTEPH

## 2025-05-28 ENCOUNTER — HOSPITAL ENCOUNTER (OUTPATIENT)
Dept: RADIOLOGY | Facility: EXTERNAL LOCATION | Age: 60
Discharge: HOME | End: 2025-05-28

## 2025-05-28 DIAGNOSIS — Z12.31 OTHER SCREENING MAMMOGRAM: ICD-10-CM

## 2025-05-28 DIAGNOSIS — R92.8 ABNORMAL MAMMOGRAM: ICD-10-CM

## 2025-06-02 ENCOUNTER — APPOINTMENT (OUTPATIENT)
Dept: RHEUMATOLOGY | Facility: CLINIC | Age: 60
End: 2025-06-02
Payer: COMMERCIAL

## 2025-06-02 VITALS
SYSTOLIC BLOOD PRESSURE: 120 MMHG | WEIGHT: 217.4 LBS | RESPIRATION RATE: 16 BRPM | BODY MASS INDEX: 40.01 KG/M2 | HEIGHT: 62 IN | HEART RATE: 68 BPM | TEMPERATURE: 98 F | OXYGEN SATURATION: 98 % | DIASTOLIC BLOOD PRESSURE: 84 MMHG

## 2025-06-02 DIAGNOSIS — M06.9 RHEUMATOID ARTHRITIS INVOLVING MULTIPLE SITES, UNSPECIFIED WHETHER RHEUMATOID FACTOR PRESENT (MULTI): Primary | ICD-10-CM

## 2025-06-02 PROCEDURE — 1036F TOBACCO NON-USER: CPT | Performed by: INTERNAL MEDICINE

## 2025-06-02 PROCEDURE — 99214 OFFICE O/P EST MOD 30 MIN: CPT | Performed by: INTERNAL MEDICINE

## 2025-06-02 PROCEDURE — 3008F BODY MASS INDEX DOCD: CPT | Performed by: INTERNAL MEDICINE

## 2025-06-02 NOTE — PROGRESS NOTES
Subjective   Patient ID: Brie Pete is a 59 y.o. female who presents for  follow up regarding rheumatoid arthritis.    HPI 58 yo F here for f/u re: RA.      She remained on sulfasalazine 1500 mg twice daily and prednisone 5 mg daily.  Whenever we previously tried to taper the prednisone, she experienced a flare of her arthritis.    Methotrexate was previously ineffective.    She is scheduled to have her left hip replaced July 21, 2025 by Dr. Ceballos.  She is currently walking with a cane in the right hand.    She noticed enlargement of her left sternoclavicular joint over the last several months.    Chest CT done 2/25 showed*redness of both sternoclavicular joints, left greater than right.    She had her left knee replaced 12/23.    LS-spine x-ray done September 2022 shows S shaped scoliosis, with multilevel degenerative disc disease.  There is mild retrolisthesis of L2 and L3 and L4 on L5.  Moderate degenerative changes are noted in the left hip.     She completed 6 weeks of physical therapy in 2022 for low back pain .  MRI of LS spine done February 2023 showed left parasagittal disc herniation at L2, with multilevel degenerative disc disease.  There are varying degrees of neuroforaminal narrowing on the left, especially at L2-3, L3-4, and L4-5.  She has moderately severe spinal stenosis at L4-5.    He has had 3 epidural injections, last being March 25, 2024.   These have helped.    xray of her left hip 5/20 showed mild OA.     She has been on chronic daily prednisone since prior to 2005 (dose was initially 10 mg every other day alternating with 5 mg every other day)-he has now been weaned to 5 mg daily.   She started alendronate 2/21, but she admitted that she stopped it 3/23.     DEXA 7/24 shows osteoporosis, with lowest T-score -2.8 and left total hip.  She was advised to resume alendronate 70 mg orally once weekly July 2024, which she did.    Her daughter is still struggling with antisynthetase  "syndrome.     DEXA  shows T score -1.1 FN.  DEXA 10/21: T score -2.1 left femoral neck, T score -1.5 left total hip, normal lumbar spine T score  DEXA 2024: T-score -2.8 left total hip , T-score -2.5 left femoral neck, T-score normal lumbar spine     Labs 2022: CMP normal, CBC normal, CRP less than 0.4 (0- 0.9), 25-hydroxy vitamin D 47, TSH 2.2, hemoglobin A1c 4.9  Labs 2024: CMP normal except albumin 3.3, CBC normal, CRP 0.6 (normal 0-0.9)  Labs 2024: CRP 0.86 (normal less than 1), CBC with differential normal, CMP normal except glucose 123  Labs 10/24: 25-hydroxy vitamin D 58, TSH 4.44, BMP normal, free T4 normal      Medical problem list:  Rheumatoid arthritis  Asthma  Osteoporosis  Hypothyroidism    Surgeries:  Left knee replaced   Tonsillectomy  Laparoscopic cholecystectomy    section  ERCP with removal of stones from biliary duct       Review of Systems  General: Denies fevers or chills.  CV: Denies chest pain or palpitations.  Denies leg edema.  Lungs: Denies coughing or shortness of breath.  Skin: Denies rashes or nodules.  MS: Complains of left hip pain.    Objective   /80 (BP Location: Left arm, Patient Position: Sitting, BP Cuff Size: Adult)   Pulse 68   Temp 36.7 °C (98 °F) (Temporal)   Resp 16   Ht 1.575 m (5' 2\")   Wt 98.6 kg (217 lb 6.4 oz)   LMP  (LMP Unknown)   SpO2 98%   BMI 39.76 kg/m²   Visit Vitals  /84 (BP Location: Left arm, Patient Position: Sitting, BP Cuff Size: Large adult)   Pulse 68   Temp 36.7 °C (98 °F) (Temporal)   Resp 16   Ht 1.575 m (5' 2\")   Wt 98.6 kg (217 lb 6.4 oz)   LMP  (LMP Unknown)   SpO2 98%   BMI 39.76 kg/m²   OB Status Postmenopausal   Smoking Status Former   BSA 2.08 m²       Physical Exam  General Appearance: Well-nourished and well-appearing  HEENT: PERRL, EOMI  Neck: Supple, no nodes.  CV: RRR, no MGR.  Lungs: Clear, no rales or wheezes.  Abdomen: Soft, nontender. No hepatosplenomegaly.  Extremities: "  No cyanosis, clubbing, or edema.  MS: Swollen: 2 (2nd MCP bilaterally)         Tender: 0         Patient global: 6         Evaluator global: 3          CDAI: 9 (low disease activity)          Ambulating with cane.           Status post left knee replacement. Has limited internal rotation of left hip.            Bony prominence of left sternoclavicular joint.            Skin: No nodules or vasculitic lesions.    .  Assessment/Plan     Problem List Items Addressed This Visit           ICD-10-CM    Rheumatoid arthritis - Primary M06.9    Relevant Orders    CBC and Auto Differential    Comprehensive Metabolic Panel    C-Reactive Protein    BMI 39.0-39.9,adult Z68.39     1. RA- low disease activity by CDAI. Scheduled for left hip replacement 7/21/25.  Hopefully after her hip replacement, we can start tapering her prednisone.     2. Osteoporosis- DEXA 10/21 shows T score -2.1 left femoral neck.   Alendronate added 2/21- however, she stopped about 3/23 (on her own, without advising me).  She is still taking Citracal 500 mg daily and vitamin D 5000 international units daily.  DEXA 7/24 shows osteoporosis, with lowest T-score -2.8 left total hip.  She resumed alendronate 70 mg orally once weekly July 2024.  Alendronate will likely be continued through July 2029, followed by 1 to 2-year drug holiday.    3. Lumbar spondylosis-she has had 3 epidural injections, the most recent being March 25, 2024 . these have helped .    4. BMI 39- stable, will continue to work on weight reduction.    Plan:  Check CBC with diff, CMP, CRP.  Follow-up in 3 months.

## 2025-06-03 NOTE — PROGRESS NOTES
Brie Pete female   1965 59 y.o.   [id number]      Chief Complaint  Abnormal mammogram    History Of Present Illness  Brie Pete is a very pleasant 59 y.o.  female [facility] breast mass noted on recent imaging with [facility]. She has no family history of breast cancer. She denies breast surgery or biopsy. She is here with her daughter, Violet.     BREAST IMAGING: 3/8/2025 [facility] Bilateral screening mammogram, indicates BI-RADS Category 0. Bilateral asymmetries warranting additional views. 2025 [facility] Bilateral diagnostic mammogram and ultrasound, indicates BI-RADS Category 3. Right breast, 7:00, 12 cm from the nipple, 7 x 6 x 3 mm very superficial isoechoic to slightly hypoechoic circumscribed mass warranting short term mammographic and sonographic follow up. Left axillary sebaceous cyst.     REPRODUCTIVE HISTORY: menarche age 10, , first birth age 29,  x 9 months, no OCP's, natural menopause age 51, no HRT, scattered fibroglandular tissue    FAMILY CANCER HISTORY:   Mother: Lung cancer, age 58, smoker    Surgical History  She has a past surgical history that includes Other surgical history (2016); Cholecystectomy (2015); Tonsillectomy (2013);  section, classic (2013); Total knee arthroplasty; and Knee Arthroplasty (2023).     Social History  She reports that she quit smoking about 25 years ago. Her smoking use included cigarettes. She has never used smokeless tobacco. She reports that she does not drink alcohol and does not use drugs.    Family History  Family History[1]     Allergies  Ceftazidime, Cefuroxime, Rofecoxib, Sulfamethoxazole-trimethoprim, Erythromycin, Latex, and Rocephin [ceftriaxone]    Medications  Current Outpatient Medications   Medication Instructions    albuterol 90 mcg/actuation inhaler 2 puffs, inhalation, Every 6 hours PRN    alendronate (FOSAMAX) 70  mg, oral, Every 7 days, Take in the morning with a full glass of water, on an empty stomach, and do not take anything else by mouth or lie down for the next 30 min.    calcium carbonate-vitamin D3 (Caltrate 600 plus D) 600 mg-20 mcg (800 unit) tablet,chewable 1 tablet, Daily    cholecalciferol (VITAMIN D-3) 125 mcg, oral, Daily RT    fluticasone-umeclidin-vilanter (Trelegy Ellipta) 200-62.5-25 mcg blister with device Daily RT    ipratropium-albuteroL (Duo-Neb) 0.5-2.5 mg/3 mL nebulizer solution 4 times daily    levothyroxine (SYNTHROID, LEVOXYL) 75 mcg, oral, Daily before breakfast    predniSONE (Deltasone) 5 mg tablet Take 1 tab daily    pregabalin (LYRICA) 150 mg, 2 times daily    sulfaSALAzine (Azulfidine) 500 mg DR tablet TAKE 3 TABS BY MOUTH 2 TIMES DAILY.    zoster vaccine-recombinant adjuvanted (Shingrix) 50 mcg/0.5 mL vaccine Give 1 injection, 0.5 ml IM now, and repeat in 8 weeks         REVIEW OF SYSTEMS    Constitutional:  Negative for appetite change, fatigue, fever and unexpected weight change.   HENT:  Negative for ear pain, hearing loss, nosebleeds, sore throat and trouble swallowing.    Eyes:  Negative for discharge, itching and visual disturbance.   Respiratory:  Negative for cough, chest tightness and shortness of breath.    Cardiovascular:  Negative for chest pain, palpitations and leg swelling.   Breast: as indicated in HPI  Gastrointestinal:  Negative for abdominal pain, constipation, diarrhea and nausea.   Endocrine: Negative for cold intolerance and heat intolerance.   Genitourinary:  Negative for dysuria, frequency, hematuria, pelvic pain and vaginal bleeding.   Musculoskeletal:  Negative for arthralgias, back pain, gait problem, joint swelling and myalgias.   Skin:  Negative for color change and rash.   Allergic/Immunologic: Negative for environmental allergies and food allergies.   Neurological:  Negative for dizziness, tremors, speech difficulty, weakness, numbness and headaches.    Hematological:  Does not bruise/bleed easily.   Psychiatric/Behavioral:  Negative for agitation, dysphoric mood and sleep disturbance. The patient is not nervous/anxious.         Past Medical History  She has a past medical history of Abnormal results of thyroid function studies (11/05/2015), Acute pharyngitis, unspecified (11/15/2015), Acute pharyngitis, unspecified (08/30/2016), Acute pharyngitis, unspecified (03/03/2014), Acute sinusitis, unspecified, Dysphagia, unspecified (02/12/2015), Localized enlarged lymph nodes (04/19/2014), Other conditions influencing health status, Other specified abnormal findings of blood chemistry (03/07/2016), Personal history of other diseases of the digestive system (02/12/2015), Personal history of other diseases of the respiratory system, Personal history of other diseases of the respiratory system (03/03/2014), Personal history of other diseases of the respiratory system, Personal history of other diseases of the respiratory system, Personal history of other infectious and parasitic diseases (01/23/2017), Plantar wart, Rheumatoid arthritis, unspecified, and Venous insufficiency (chronic) (peripheral).     Physical Exam  Patient is alert and oriented x3 and in a relaxed and appropriate mood. Her gait is steady and hand grasps are equal. Sclera is clear. The breasts are nearly symmetrical, large and pendulous. The tissue is soft without palpable abnormalities, discrete nodules or masses. The right breast, 7:00, 12 cm from the nipple with a 1 x 1 cm superficial soft round nodule. The left axilla has a 1.5 x 0.5 cm superficial palpable and visible sebaceous cyst. The skin and nipples appear normal. There is no cervical, supraclavicular or axillary lymphadenopathy. Heart rate and rhythm normal, S1 and S2 appreciated. The lungs are clear to auscultation bilaterally.       Physical Exam  Chest:              Last Recorded Vitals  Vitals:    06/11/25 1444   BP: 110/72   Pulse: 98    Resp: 16       Relevant Results   Time was spent discussing digital images of the radiology testing with the patient. I explained the results in depth, along with suggested explanation for follow up recommendations based on the testing results. BI-RADS Category 3    Imaging  DIAGNOSTIC BILATERAL DIGITAL MAMMOGRAM WITH TOMOSYNTHESIS.   TARGETED BILATERAL BREAST ULTRASOUND.   Clinical data: Callback from screening mammogram dated 2/28/2025 for bilateral breast findings.   Mammographic findings:   2D and tomosynthesis images were reviewed at 1 mm slice thickness.   There are scattered areas of fibroglandular density.    In the right breast, there is a persistent very superficial gently lobulated mass of slightly increased density in the central lateral aspect at posterior depth. There is no associated architectural distortion. It is probably benign.   The left axilla, there is an oval circumscribed lesion correlating to the skin on tomosynthesis. This is most compatible with a sebaceous cyst, as further demonstrated sonographically.   No suspicious masses or suspicious calcifications are identified in either breast.   The digital mammogram has been reviewed with the aid of CAD.   Sonographic findings:   In the right breast at 7:00, 12 cm from the nipple, there is a very superficial isoechoic to slightly hypoechoic circumscribed mass measuring 7 x 6 x 3 mm. There is no internal vascularity or posterior shadowing. This correlates with the mammogram and is probably benign. Differential considerations include intramammary lymph node and small hemangioma among other etiologies. This has been present dating back to 2017 but is now minimally increased in size.   In the left axilla, there is a circumscribed dermal cystic lesion with internal echoes measuring 1.4 x 1.2 x 0.9 cm. This correlates with the mammogram and is most compatible with a sebaceous cyst. There are no abnormalities in the underlying subcutaneous tissue.    IMPRESSION AND RECOMMENDATION:   1. Right breast probably benign mass at 7:00, 12 cm from the nipple. Differential considerations include intramammary lymph node and small hemangioma among other etiologies. Recommend six month follow-up right diagnostic mammogram and targeted ultrasound to ensure stability.   2. No mammographic evidence of malignancy in the left breast. Left axillary sebaceous cyst.   BI-RADS Category 3: Probably benign   Breast tissue can be either dense or not dense. Dense tissue makes it harder to find breast cancer on a mammogram and also raises the risk of developing breast cancer. Your breast tissue is not dense. Talk to your healthcare provider about breast density, risks for breast cancer, and your individual situation.   Electronically signed by:  Danilo Love MD  05/21/2025 04:10 PM EDT RP Workstation: [id number]   Technologist:  BÁRBARA   Dictated By:   DANILO LOVE MD   Signed By:     DANILO LOVE MD   Signed Out:    05/21/25 16:10:02     BI mammo bilateral screening tomosynthesis     Narrative  SCREENING BILATERAL DIGITAL MAMMOGRAM WITH TOMOSYNTHESIS  Clinical History : Screening.  Comparison Studies: Outside mammograms 6/2/2022, 3/30/2021, 4/6/2019.  Mammographic findings:  2D and tomosynthesis images were reviewed at 1 mm slice thickness.  There are scattered areas of fibroglandular density.  In the right breast, there is interval increase of a focal asymmetry in the upper outer quadrant at posterior depth.  In the left axilla, there is a new hyperdense masslike asymmetry. The remainder of the left breast is unremarkable.  The digital mammogram has been reviewed with the aid of CAD.  IMPRESSION AND RECOMMENDATION:  1. Right breast focal asymmetry demonstrates interval change. Additional spot compression views with tomosynthesis and possible ultrasound are recommended.  2. Left axillary asymmetry. This could represent a skin lesion. Recommend repeat left MLO view with any skin  lesions marked, and possible ultrasound to follow if indicated.  BI-RADS Category 0 : Incomplete--need additional imaging evaluation and/or prior mammograms for comparison  Breast tissue can be either dense or not dense. Dense tissue makes it harder to find breast cancer on a mammogram and also raises the risk of developing breast cancer. Your breast tissue is not dense. Talk to your healthcare provider about breast density, risks for breast cancer, and your individual situation.  Electronically signed by:  Danilo Love MD  03/08/2025 03:34 PM [facility] Workstation: [id number]  Technologist:  KIKI  Dictated By:   DANILO LOVE MD  Signed By:     DANILO LOVE MD  Signed Out:    03/08/25 15:34:45       Assessment/Plan   Normal clinical exam, stable imaging (SWG), right breast mass, stable, left axillary sebaceous cyst, no family history of breast cancer, scattered fibroglandular tissue    Plan: Return in November 2025 for right breast ultrasound and office visit.    Patient Discussion/Summary  Your clinical examination and imaging are stable. Please return in November 2025 for right breast ultrasound and office visit or sooner if you have any problems or concerns.     You can see your health information, review clinical summaries from office visits & test results online when you follow your health with MY  Chart, a personal health record. To sign up go to [email or web address]. If you need assistance with signing up or trouble getting into your account call [email or web address] Patient Line 24/7 at [phone].    My office phone number is [phone] if you need to get in touch with me or have additional questions or concerns. Thank you for choosing [facility] and trusting me as your healthcare provider. I look forward to seeing you again at your next office visit. I am honored to be a provider on your health care team and I remain dedicated to helping you achieve your health goals.      Giuliana Aguero,  APRN-CNP         [1]   Family History  Problem Relation Name Age of Onset    Lung cancer Mother Francy         mother  at 58    Other (tobacco) Mother Francy     COPD Mother Francy     Diabetes Mother Francy     Other (cva) Mother Francy     Arthritis Mother Francy     Asthma Mother Francy     Other (tobacco) Father Johnathon     COPD Father Johnathon     Other (metastatic cancer) Father Johnathon          at 73, metastatic cancer to brain, unknown primary    Other (cabg) Father Johnathon     Heart disease Father Johnathon     Other (renal disease) Brother          lives lovally    Other (chronic thromboembolic pulmonary hypertension) Daughter          CTEPH

## 2025-06-10 ENCOUNTER — APPOINTMENT (OUTPATIENT)
Dept: SURGICAL ONCOLOGY | Facility: HOSPITAL | Age: 60
End: 2025-06-10
Payer: COMMERCIAL

## 2025-06-11 ENCOUNTER — OFFICE VISIT (OUTPATIENT)
Dept: SURGICAL ONCOLOGY | Facility: HOSPITAL | Age: 60
End: 2025-06-11
Payer: COMMERCIAL

## 2025-06-11 VITALS
DIASTOLIC BLOOD PRESSURE: 72 MMHG | HEART RATE: 98 BPM | HEIGHT: 62 IN | RESPIRATION RATE: 16 BRPM | WEIGHT: 217 LBS | SYSTOLIC BLOOD PRESSURE: 110 MMHG | BODY MASS INDEX: 39.93 KG/M2

## 2025-06-11 DIAGNOSIS — R92.8 ABNORMAL MAMMOGRAM: Primary | ICD-10-CM

## 2025-06-11 DIAGNOSIS — N63.13 MASS OF LOWER OUTER QUADRANT OF RIGHT BREAST: ICD-10-CM

## 2025-06-11 DIAGNOSIS — L72.3 SEBACEOUS CYST OF AXILLA: ICD-10-CM

## 2025-06-11 DIAGNOSIS — R92.30 DENSE BREAST TISSUE ON MAMMOGRAM, UNSPECIFIED TYPE: ICD-10-CM

## 2025-06-11 PROCEDURE — 1036F TOBACCO NON-USER: CPT | Performed by: NURSE PRACTITIONER

## 2025-06-11 PROCEDURE — 99213 OFFICE O/P EST LOW 20 MIN: CPT | Performed by: NURSE PRACTITIONER

## 2025-06-11 PROCEDURE — 99203 OFFICE O/P NEW LOW 30 MIN: CPT | Performed by: NURSE PRACTITIONER

## 2025-06-11 PROCEDURE — 3008F BODY MASS INDEX DOCD: CPT | Performed by: NURSE PRACTITIONER

## 2025-06-11 NOTE — PATIENT INSTRUCTIONS
Your clinical examination and imaging are stable. Please return in November 2025 for right breast ultrasound and office visit or sooner if you have any problems or concerns.     You can see your health information, review clinical summaries from office visits & test results online when you follow your health with MY  Chart, a personal health record. To sign up go to [email or web address]. If you need assistance with signing up or trouble getting into your account call [employer] Patient Line 24/7 at [phone].    My office phone number is [phone] if you need to get in touch with me or have additional questions or concerns. Thank you for choosing [facility] and trusting me as your healthcare provider. I look forward to seeing you again at your next office visit. I am honored to be a provider on your health care team and I remain dedicated to helping you achieve your health goals.

## 2025-07-11 LAB
NON-UH HIE APPEARANCE, U: ABNORMAL
NON-UH HIE APTT PATIENT: 30.5 SECONDS (ref 26–36)
NON-UH HIE BILIRUBIN, U: ABNORMAL
NON-UH HIE BLOOD, U: ABNORMAL
NON-UH HIE BUN/CREAT RATIO: 15
NON-UH HIE BUN: 9 MG/DL (ref 9–23)
NON-UH HIE CALCIUM: 9.6 MG/DL (ref 8.7–10.4)
NON-UH HIE CALCULATED OSMOLALITY: 279 MOSM/KG (ref 275–295)
NON-UH HIE CHLORIDE: 107 MMOL/L (ref 98–107)
NON-UH HIE CO2, VENOUS: 27 MMOL/L (ref 20–31)
NON-UH HIE COLOR, U: ABNORMAL
NON-UH HIE CREATININE: 0.6 MG/DL (ref 0.5–0.8)
NON-UH HIE GFR AA: >60
NON-UH HIE GLOMERULAR FILTRATION RATE: >60 ML/MIN/1.73M?
NON-UH HIE GLUCOSE QUAL, U: ABNORMAL
NON-UH HIE GLUCOSE: 80 MG/DL (ref 74–106)
NON-UH HIE HCT: 38.2 % (ref 36–46)
NON-UH HIE HGB: 13 G/DL (ref 12–16)
NON-UH HIE INR: 1
NON-UH HIE INSTR WBC ND: 6.5
NON-UH HIE K: 3.9 MMOL/L (ref 3.5–5.1)
NON-UH HIE KETONES, U: ABNORMAL
NON-UH HIE LEUKOCYTE ESTERASE, U: ABNORMAL
NON-UH HIE MCH: 31.8 PG (ref 27–34)
NON-UH HIE MCHC: 34.1 G/DL (ref 32–37)
NON-UH HIE MCV: 93.4 FL (ref 80–100)
NON-UH HIE MPV: 8 FL (ref 7.4–10.4)
NON-UH HIE NA: 141 MMOL/L (ref 135–145)
NON-UH HIE NITRITE, U: ABNORMAL
NON-UH HIE PH, U: 7 (ref 4.5–8)
NON-UH HIE PLATELET: 259 X10 (ref 150–450)
NON-UH HIE PROTEIN, U: ABNORMAL
NON-UH HIE PROTIME PATIENT: 10.9 SECONDS (ref 9.8–12.4)
NON-UH HIE RBC/HPF, U: 3 #/HPF (ref 0–3)
NON-UH HIE RBC: 4.09 X10 (ref 4.2–5.4)
NON-UH HIE RDW: 14.2 % (ref 11.5–14.5)
NON-UH HIE SPECIFIC GRAVITY, U: 1.01 (ref 1–1.03)
NON-UH HIE SQUAMOUS EPITHELIAL CELLS, U: 1 #/HPF
NON-UH HIE U MICRO: ABNORMAL
NON-UH HIE UROBILINOGEN QUAL, U: ABNORMAL
NON-UH HIE WBC/HPF, U: 1 #/HPF (ref 0–5)
NON-UH HIE WBC: 6.5 X10 (ref 4.5–11)

## 2025-07-14 ENCOUNTER — APPOINTMENT (OUTPATIENT)
Dept: PRIMARY CARE | Facility: CLINIC | Age: 60
End: 2025-07-14
Payer: COMMERCIAL

## 2025-07-14 DIAGNOSIS — M06.9 RHEUMATOID ARTHRITIS, INVOLVING UNSPECIFIED SITE, UNSPECIFIED WHETHER RHEUMATOID FACTOR PRESENT (MULTI): ICD-10-CM

## 2025-07-14 RX ORDER — SULFASALAZINE 500 MG/1
TABLET, DELAYED RELEASE ORAL
Qty: 540 TABLET | Refills: 1 | Status: SHIPPED | OUTPATIENT
Start: 2025-07-14

## 2025-09-04 ENCOUNTER — APPOINTMENT (OUTPATIENT)
Dept: RHEUMATOLOGY | Facility: CLINIC | Age: 60
End: 2025-09-04
Payer: COMMERCIAL

## 2025-09-10 ENCOUNTER — APPOINTMENT (OUTPATIENT)
Dept: PRIMARY CARE | Facility: CLINIC | Age: 60
End: 2025-09-10
Payer: COMMERCIAL